# Patient Record
Sex: FEMALE | Race: WHITE | NOT HISPANIC OR LATINO | Employment: UNEMPLOYED | URBAN - METROPOLITAN AREA
[De-identification: names, ages, dates, MRNs, and addresses within clinical notes are randomized per-mention and may not be internally consistent; named-entity substitution may affect disease eponyms.]

---

## 2018-06-18 ENCOUNTER — HOSPITAL ENCOUNTER (EMERGENCY)
Facility: HOSPITAL | Age: 38
Discharge: HOME | End: 2018-06-18
Attending: EMERGENCY MEDICINE

## 2018-06-18 VITALS
DIASTOLIC BLOOD PRESSURE: 88 MMHG | HEART RATE: 78 BPM | TEMPERATURE: 99 F | HEIGHT: 66 IN | BODY MASS INDEX: 45 KG/M2 | OXYGEN SATURATION: 99 % | SYSTOLIC BLOOD PRESSURE: 148 MMHG | RESPIRATION RATE: 18 BRPM | WEIGHT: 280 LBS

## 2018-06-18 DIAGNOSIS — N93.9 VAGINAL BLEEDING: Primary | ICD-10-CM

## 2018-06-18 LAB
B-HCG UR QL: NEGATIVE
BACTERIA GENITAL QL WET PREP: ABNORMAL
CLUE CELLS VAG QL WET PREP: ABNORMAL
CTP QC/QA: YES
FILAMENT FUNGI VAG WET PREP-#/AREA: ABNORMAL
SPECIMEN SOURCE: ABNORMAL
T VAGINALIS GENITAL QL WET PREP: ABNORMAL
WBC #/AREA VAG WET PREP: ABNORMAL
YEAST GENITAL QL WET PREP: ABNORMAL

## 2018-06-18 PROCEDURE — 99283 EMERGENCY DEPT VISIT LOW MDM: CPT

## 2018-06-18 PROCEDURE — 81025 URINE PREGNANCY TEST: CPT | Performed by: PHYSICIAN ASSISTANT

## 2018-06-18 PROCEDURE — 87491 CHLMYD TRACH DNA AMP PROBE: CPT

## 2018-06-18 PROCEDURE — 87210 SMEAR WET MOUNT SALINE/INK: CPT

## 2018-06-18 NOTE — DISCHARGE INSTRUCTIONS
Continue tylenol and motrin as needed for pain.  Call and schedule follow up with a GYN.  For worsening symptoms, chest pain, shortness of breath, increased abdominal pain, high grade fever, stroke or stroke like symptoms, immediately go to the nearest Emergency Room or call 911 as soon as possible.

## 2018-06-18 NOTE — ED PROVIDER NOTES
"Encounter Date: 6/18/2018    SCRIBE #1 NOTE: I Rajanijacqueline Maciel, am scribing for, and in the presence of, Albertina Veliz PA-C.       History     Chief Complaint   Patient presents with    Vaginal Bleeding     since 6/14/18 with bilat groin pain.       06/18/2018 4:01 PM     Chief complaint: Vaginal Bleeding      Elvia Cheney is a 38 y.o. female with no pertinent PMHx or SHx who presents to the ED with complaints vaginal bleeding and lower abdominal pain. The patient states her last menstrual cycle was on the "1st of last month, which is normally when she has her cycle". The patient states having lower abdominal "cramping" pain that started x4 days ago (6/14) along with "heavy" vaginal bleeding. The patient states having to change her "ultra pad" once every hour. The patient is not on birth control currently. She denies burning during urination. She has taken Ibuprofen with no relief to pian. She denies having any other vaginal discharge. Dr. John Bruce is the patient's OB/GYN. The patient does report having a history of ovarian cysts. She has no other medical concerns or complaints at this moment. She denies onset of any other new symptoms currently. Penicillin allergy noted.       The history is provided by the patient.     Review of patient's allergies indicates:   Allergen Reactions    Penicillins Hives     History reviewed. No pertinent past medical history.  History reviewed. No pertinent surgical history.  History reviewed. No pertinent family history.  Social History   Substance Use Topics    Smoking status: Never Smoker    Smokeless tobacco: Not on file    Alcohol use Not on file     Review of Systems   Constitutional: Negative for activity change, appetite change, chills and fever.   HENT: Negative for congestion, rhinorrhea and sore throat.    Respiratory: Negative for cough, chest tightness and shortness of breath.    Cardiovascular: Negative for chest pain.   Gastrointestinal: Positive for " abdominal pain. Negative for abdominal distention, blood in stool, diarrhea, nausea and vomiting.   Genitourinary: Positive for urgency and vaginal bleeding. Negative for decreased urine volume, dysuria, frequency and vaginal discharge.   Musculoskeletal: Negative for back pain, neck pain and neck stiffness.   Skin: Negative for rash.   Neurological: Negative for dizziness, syncope, numbness and headaches.   Hematological: Does not bruise/bleed easily.       Physical Exam     Initial Vitals [06/18/18 1529]   BP Pulse Resp Temp SpO2   (!) 183/106 88 16 98.4 °F (36.9 °C) 97 %      MAP       --         Physical Exam    Nursing note and vitals reviewed.  Constitutional: Vital signs are normal. She appears well-developed and well-nourished. She is not diaphoretic. She is cooperative.  Non-toxic appearance. She does not have a sickly appearance. No distress.   HENT:   Head: Normocephalic and atraumatic.   Right Ear: Abnromal external ear normal.   Left Ear: Abnormal external ear normal.   Nose: Nose abnormal.   Mouth/Throat: Oropharynx is clear and moist.   Eyes: Conjunctivae and lids are normal. Pupils are equal, round, and reactive to light.   Neck: Normal range of motion and full passive range of motion without pain. Neck supple.   Cardiovascular: Normal rate, regular rhythm and normal heart sounds. Exam reveals no gallop and no friction rub.    No murmur heard.  Pulmonary/Chest: Breath sounds normal. She has no wheezes. She has no rhonchi. She has no rales.   Abdominal: Soft. Normal appearance. There is tenderness. There is no rigidity, no rebound and no guarding.   Mild lower abdominal tenderness.    Genitourinary: Cervix exhibits no motion tenderness. Right adnexum displays no tenderness. Left adnexum displays no tenderness.   Genitourinary Comments: Moderate amount of blood in vaginal vault. No signs of vaginal hemorrhage. No CMT. No adnexal tenderness.    Neurological: She is alert and oriented to person, place,  and time.   Skin: Skin is warm, dry and intact. No rash noted.         ED Course   Procedures  Labs Reviewed   VAGINAL SCREEN - Abnormal; Notable for the following:        Result Value    WBC - Vaginal Screen Rare (*)     Bacteria - Vaginal Screen Few (*)     All other components within normal limits   C. TRACHOMATIS/N. GONORRHOEAE BY AMP DNA   POCT URINE PREGNANCY          No orders to display        Medical Decision Making:   History:   Old Medical Records: I decided to obtain old medical records.  Clinical Tests:   Lab Tests: Reviewed and Ordered       APC / Resident Notes:   Urgent evaluation of a 38-year-old female who presents with vaginal bleeding.  She is UPT negative. She states that she is 2 weeks late for her routine menstrual cycle.  She reports heavier bleeding than normal. Her vital signs are stable. She has mild generalized tenderness to palpation.  exam shows a moderate amount of blood in the vaginal vault.  No active hemorrhage.  There is no cervical motion tenderness or adnexal tenderness. She denies concern for STDs.  I doubt PID or tea away.  She denies urinary symptoms.  UPT is negative so no concern for ectopic pregnancy or miscarriage.  Vaginal screen reviewed.  She needs to follow up with gyn for further evaluation. Discussed results with patient. Return precautions given. Based on my clinical evaluation, I do not appreciate any immediate, emergent, or life threatening condition or etiology that warrants additional workup today and feel that the patient can be discharged with close follow up care.  Patient is to follow up with their primary care provider. Case was discussed with Dr. Jo who is in agreement with the plan of care. All questions answered.          Scribe Attestation:   Scribe #1: I performed the above scribed service and the documentation accurately describes the services I performed. I attest to the accuracy of the note.      I, Albertina Veliz PA-C, personally  performed the services described in this documentation. All medical record entries made by the scribe were at my direction and in my presence.  I have reviewed the chart and agree that the record reflects my personal performance and is accurate and complete. Albertina Veliz PA-C.  5:31 PM 06/18/2018             Clinical Impression:   The encounter diagnosis was Vaginal bleeding.      Disposition:   Disposition: Discharged  Condition: Stable                        Albertina Veliz PA-C  06/18/18 8617

## 2018-06-19 LAB
C TRACH DNA SPEC QL NAA+PROBE: NOT DETECTED
N GONORRHOEA DNA SPEC QL NAA+PROBE: NOT DETECTED

## 2022-08-01 ENCOUNTER — OFFICE VISIT (OUTPATIENT)
Dept: URGENT CARE | Facility: CLINIC | Age: 42
End: 2022-08-01
Payer: COMMERCIAL

## 2022-08-01 VITALS
WEIGHT: 260 LBS | HEIGHT: 66 IN | OXYGEN SATURATION: 97 % | TEMPERATURE: 98 F | DIASTOLIC BLOOD PRESSURE: 85 MMHG | SYSTOLIC BLOOD PRESSURE: 131 MMHG | BODY MASS INDEX: 41.78 KG/M2 | HEART RATE: 87 BPM | RESPIRATION RATE: 16 BRPM

## 2022-08-01 DIAGNOSIS — M54.2 NECK PAIN ON LEFT SIDE: Primary | ICD-10-CM

## 2022-08-01 DIAGNOSIS — M43.6 NECK STIFFNESS: ICD-10-CM

## 2022-08-01 DIAGNOSIS — M54.12 CERVICAL RADICULOPATHY: ICD-10-CM

## 2022-08-01 PROCEDURE — 99213 OFFICE O/P EST LOW 20 MIN: CPT | Mod: S$GLB,,,

## 2022-08-01 PROCEDURE — 99213 PR OFFICE/OUTPT VISIT, EST, LEVL III, 20-29 MIN: ICD-10-PCS | Mod: S$GLB,,,

## 2022-08-01 RX ORDER — PHENTERMINE HYDROCHLORIDE 37.5 MG/1
37.5 TABLET ORAL EVERY MORNING
COMMUNITY
Start: 2022-07-29 | End: 2023-09-25

## 2022-08-01 RX ORDER — CYCLOBENZAPRINE HCL 5 MG
5 TABLET ORAL 3 TIMES DAILY PRN
Qty: 15 TABLET | Refills: 0 | Status: SHIPPED | OUTPATIENT
Start: 2022-08-01 | End: 2022-08-06

## 2022-08-01 RX ORDER — PREDNISONE 20 MG/1
20 TABLET ORAL 2 TIMES DAILY
Qty: 10 TABLET | Refills: 0 | Status: SHIPPED | OUTPATIENT
Start: 2022-08-01 | End: 2022-08-06

## 2022-08-01 NOTE — PROGRESS NOTES
"Subjective:       Patient ID: Elvia Cheney is a 42 y.o. female.    Vitals:  height is 5' 6" (1.676 m) and weight is 117.9 kg (260 lb). Her temperature is 97.9 °F (36.6 °C). Her blood pressure is 131/85 and her pulse is 87. Her respiration is 16 and oxygen saturation is 97%.     Chief Complaint: Neck Pain (/)    Left side of her neck started hurting a couple weeks ago and now its radiating down her left arm. meds tried- tylenol and ibuprofen. Denies injury. She goes to the gym some but that's it.      Constitution: Negative for activity change, appetite change, chills, sweating and fever.   HENT: Negative for ear pain, sinus pain, sinus pressure and sore throat.    Neck: Negative for neck pain.   Cardiovascular: Negative for chest pain.   Eyes: Negative for blurred vision.   Respiratory: Negative for chest tightness, cough and shortness of breath.    Gastrointestinal: Negative for abdominal pain.   Musculoskeletal: Positive for pain (left sided neck pain that radiates down her arm).   Neurological: Negative for dizziness and history of vertigo.     Denies loss of bowel or bladder control. No groin or perineal numbness.   Objective:      Physical Exam   Constitutional: She is oriented to person, place, and time.  Non-toxic appearance. She does not appear ill. No distress.   HENT:   Head: Normocephalic.   Nose: Nose normal.   Eyes: Conjunctivae are normal. Extraocular movement intact   Neck: No neck rigidity present. No decreased range of motion present. pain with movement (left side) present.   Cardiovascular: Normal rate, normal heart sounds and normal pulses.   Pulmonary/Chest: Effort normal and breath sounds normal. No respiratory distress.   Musculoskeletal:      Cervical back: She exhibits tenderness (left paraspinal pain that radiates down left arm). She exhibits no bony tenderness.      Thoracic back: Normal. She exhibits normal range of motion and no bony tenderness.      Lumbar back: Normal. She exhibits no " tenderness and no bony tenderness.   Neurological: no focal deficit. She is alert and oriented to person, place, and time.   Skin: Skin is not diaphoretic. Capillary refill takes 2 to 3 seconds.   Psychiatric: Her behavior is normal. Mood normal.   5/5 strength on bilateral arms and legs. Normal sensation.       Assessment:       1. Neck pain on left side    2. Cervical radiculopathy    3. Neck stiffness          Plan:         Neck pain on left side    Cervical radiculopathy  -     predniSONE (DELTASONE) 20 MG tablet; Take 1 tablet (20 mg total) by mouth 2 (two) times daily. for 5 days  Dispense: 10 tablet; Refill: 0    Neck stiffness  -     cyclobenzaprine (FLEXERIL) 5 MG tablet; Take 1 tablet (5 mg total) by mouth 3 (three) times daily as needed for Muscle spasms.  Dispense: 15 tablet; Refill: 0         Patient presents with left sided neck pain x 2 weeks. Denies injury, trauma or fall. Patient has 5/5 strength in bilateral arms. Patient has no bony tenderness on exam. Patient offered xray but declined. Likely cervical radiculopathy. Will treat with steroids and muscle ralexers. Patient informed to follow up with ortho if symptoms do not improve or worsen.

## 2022-08-01 NOTE — PATIENT INSTRUCTIONS
Rotate tylenol and motrin as needed for pain.     Do not drive or drink alcohol while taking muscle relaxer's.     Rest, Ice, elevate and compression of the affected extremity.     If no improvements with conservative measurements follow up with Orthopedics as directed.      [facility] [place]: [phone]

## 2022-11-02 ENCOUNTER — OFFICE VISIT (OUTPATIENT)
Dept: URGENT CARE | Facility: CLINIC | Age: 42
End: 2022-11-02
Payer: COMMERCIAL

## 2022-11-02 VITALS
WEIGHT: 258.38 LBS | BODY MASS INDEX: 41.52 KG/M2 | RESPIRATION RATE: 20 BRPM | TEMPERATURE: 98 F | OXYGEN SATURATION: 97 % | HEART RATE: 101 BPM | DIASTOLIC BLOOD PRESSURE: 84 MMHG | HEIGHT: 66 IN | SYSTOLIC BLOOD PRESSURE: 129 MMHG

## 2022-11-02 DIAGNOSIS — J06.9 VIRAL URI WITH COUGH: ICD-10-CM

## 2022-11-02 DIAGNOSIS — R05.9 COUGH, UNSPECIFIED TYPE: Primary | ICD-10-CM

## 2022-11-02 LAB
CTP QC/QA: YES
CTP QC/QA: YES
FLUAV AG NPH QL: NEGATIVE
FLUBV AG NPH QL: NEGATIVE
SARS-COV-2 AG RESP QL IA.RAPID: NEGATIVE

## 2022-11-02 PROCEDURE — 87804 INFLUENZA ASSAY W/OPTIC: CPT | Mod: QW,,, | Performed by: NURSE PRACTITIONER

## 2022-11-02 PROCEDURE — 87811 SARS CORONAVIRUS 2 ANTIGEN POCT, MANUAL READ: ICD-10-PCS | Mod: QW,S$GLB,, | Performed by: NURSE PRACTITIONER

## 2022-11-02 PROCEDURE — 87804 POCT INFLUENZA A/B: ICD-10-PCS | Mod: 59,QW,, | Performed by: NURSE PRACTITIONER

## 2022-11-02 PROCEDURE — 99204 PR OFFICE/OUTPT VISIT, NEW, LEVL IV, 45-59 MIN: ICD-10-PCS | Mod: S$GLB,,, | Performed by: NURSE PRACTITIONER

## 2022-11-02 PROCEDURE — 87811 SARS-COV-2 COVID19 W/OPTIC: CPT | Mod: QW,S$GLB,, | Performed by: NURSE PRACTITIONER

## 2022-11-02 PROCEDURE — 99204 OFFICE O/P NEW MOD 45 MIN: CPT | Mod: S$GLB,,, | Performed by: NURSE PRACTITIONER

## 2022-11-02 RX ORDER — BENZONATATE 200 MG/1
200 CAPSULE ORAL 3 TIMES DAILY PRN
Qty: 21 CAPSULE | Refills: 0 | Status: SHIPPED | OUTPATIENT
Start: 2022-11-02 | End: 2022-11-09

## 2022-11-02 RX ORDER — PROMETHAZINE HYDROCHLORIDE AND DEXTROMETHORPHAN HYDROBROMIDE 6.25; 15 MG/5ML; MG/5ML
5 SYRUP ORAL NIGHTLY PRN
Qty: 50 ML | Refills: 0 | Status: SHIPPED | OUTPATIENT
Start: 2022-11-02 | End: 2022-11-12

## 2022-11-02 RX ORDER — PROMETHAZINE HYDROCHLORIDE AND DEXTROMETHORPHAN HYDROBROMIDE 6.25; 15 MG/5ML; MG/5ML
5 SYRUP ORAL NIGHTLY PRN
Qty: 50 ML | Refills: 0 | Status: SHIPPED | OUTPATIENT
Start: 2022-11-02 | End: 2022-11-02

## 2022-11-02 RX ORDER — BENZONATATE 200 MG/1
200 CAPSULE ORAL 3 TIMES DAILY PRN
Qty: 21 CAPSULE | Refills: 0 | Status: SHIPPED | OUTPATIENT
Start: 2022-11-02 | End: 2022-11-02

## 2022-11-02 RX ORDER — FLUTICASONE PROPIONATE 50 MCG
1 SPRAY, SUSPENSION (ML) NASAL DAILY
Qty: 18.2 ML | Refills: 0 | Status: SHIPPED | OUTPATIENT
Start: 2022-11-02 | End: 2022-11-02

## 2022-11-02 RX ORDER — FLUTICASONE PROPIONATE 50 MCG
1 SPRAY, SUSPENSION (ML) NASAL DAILY
Qty: 18.2 ML | Refills: 0 | Status: SHIPPED | OUTPATIENT
Start: 2022-11-02 | End: 2023-06-07

## 2022-11-02 NOTE — PROGRESS NOTES
"Subjective:       Patient ID: Elvia Cheney is a 42 y.o. female.    Vitals:  height is 5' 6" (1.676 m) and weight is 117.2 kg (258 lb 6.4 oz). Her temperature is 97.5 °F (36.4 °C). Her blood pressure is 129/84 and her pulse is 101. Her respiration is 20 and oxygen saturation is 97%.     Chief Complaint: Cough    Cough  The current episode started in the past 7 days (3 days). The problem has been waxing and waning. Associated symptoms include chills, nasal congestion, rhinorrhea and a sore throat.     Constitution: Positive for chills.   HENT:  Positive for sore throat.    Respiratory:  Positive for cough.      Objective:      Physical Exam   Constitutional: She is oriented to person, place, and time.   HENT:   Head: Normocephalic and atraumatic.   Ears:   Right Ear: Tympanic membrane, external ear and ear canal normal.   Left Ear: Tympanic membrane, external ear and ear canal normal.   Nose: Congestion present.   Mouth/Throat: Posterior oropharyngeal erythema present.   Eyes: Conjunctivae are normal. Extraocular movement intact   Neck: Neck supple.   Cardiovascular: Normal rate, regular rhythm, normal heart sounds and normal pulses.   Pulmonary/Chest: Effort normal and breath sounds normal.   Abdominal: Normal appearance. Soft.   Musculoskeletal: Normal range of motion.         General: Normal range of motion.   Neurological: She is alert and oriented to person, place, and time.   Skin: Skin is warm and dry. Capillary refill takes 2 to 3 seconds.   Psychiatric: Her behavior is normal. Mood normal.   Nursing note and vitals reviewed.      Assessment:       1. Cough, unspecified type    2. Viral URI with cough      Influenza A/B: Negative    Covid antigen: Negative    Plan:         Cough, unspecified type  -     POCT Influenza A/B  -     SARS Coronavirus 2 Antigen, POCT Manual Read    Viral URI with cough  -     promethazine-dextromethorphan (PROMETHAZINE-DM) 6.25-15 mg/5 mL Syrp; Take 5 mLs by mouth nightly as needed " (cough).  Dispense: 50 mL; Refill: 0  -     benzonatate (TESSALON) 200 MG capsule; Take 1 capsule (200 mg total) by mouth 3 (three) times daily as needed for Cough.  Dispense: 21 capsule; Refill: 0  -     fluticasone propionate (FLONASE) 50 mcg/actuation nasal spray; 1 spray (50 mcg total) by Each Nostril route once daily.  Dispense: 18.2 mL; Refill: 0       Rest  Increase fluid intake to thin secretions  Promethazine DM 1 teaspoon at bedtime as needed for cough  Tessalon 200mg capsule Take 1 capsule 3 times daily as needed for cough  Flonase nasal spray 1 spray to each nostril daily  Tylenol/Ibuprofen as directed for fever/body aches   Follow up if your symptoms persist or worsen

## 2022-11-02 NOTE — PATIENT INSTRUCTIONS
Rest  Increase fluid intake to thin secretions  Promethazine DM 1 teaspoon at bedtime as needed for cough  Tessalon 200mg capsule Take 1 capsule 3 times daily as needed for cough  Flonase nasal spray 1 spray to each nostril daily  Tylenol/Ibuprofen as directed for fever/body aches

## 2023-06-07 ENCOUNTER — OFFICE VISIT (OUTPATIENT)
Dept: FAMILY MEDICINE | Facility: CLINIC | Age: 43
End: 2023-06-07
Payer: COMMERCIAL

## 2023-06-07 VITALS
WEIGHT: 265.63 LBS | OXYGEN SATURATION: 96 % | BODY MASS INDEX: 39.34 KG/M2 | HEIGHT: 69 IN | TEMPERATURE: 99 F | SYSTOLIC BLOOD PRESSURE: 132 MMHG | HEART RATE: 94 BPM | DIASTOLIC BLOOD PRESSURE: 88 MMHG

## 2023-06-07 DIAGNOSIS — Z12.4 ENCOUNTER FOR SCREENING FOR CERVICAL CANCER: ICD-10-CM

## 2023-06-07 DIAGNOSIS — Z12.31 SCREENING MAMMOGRAM FOR BREAST CANCER: ICD-10-CM

## 2023-06-07 DIAGNOSIS — T30.0 BURN: ICD-10-CM

## 2023-06-07 DIAGNOSIS — Z00.00 ENCOUNTER FOR ANNUAL GENERAL MEDICAL EXAMINATION WITHOUT ABNORMAL FINDINGS IN ADULT: Primary | ICD-10-CM

## 2023-06-07 DIAGNOSIS — E66.9 OBESITY (BMI 35.0-39.9 WITHOUT COMORBIDITY): ICD-10-CM

## 2023-06-07 PROCEDURE — 99213 PR OFFICE/OUTPT VISIT, EST, LEVL III, 20-29 MIN: ICD-10-PCS | Mod: S$GLB,,, | Performed by: STUDENT IN AN ORGANIZED HEALTH CARE EDUCATION/TRAINING PROGRAM

## 2023-06-07 PROCEDURE — 99999 PR PBB SHADOW E&M-EST. PATIENT-LVL V: ICD-10-PCS | Mod: PBBFAC,,, | Performed by: STUDENT IN AN ORGANIZED HEALTH CARE EDUCATION/TRAINING PROGRAM

## 2023-06-07 PROCEDURE — 99999 PR PBB SHADOW E&M-EST. PATIENT-LVL V: CPT | Mod: PBBFAC,,, | Performed by: STUDENT IN AN ORGANIZED HEALTH CARE EDUCATION/TRAINING PROGRAM

## 2023-06-07 PROCEDURE — 99213 OFFICE O/P EST LOW 20 MIN: CPT | Mod: S$GLB,,, | Performed by: STUDENT IN AN ORGANIZED HEALTH CARE EDUCATION/TRAINING PROGRAM

## 2023-06-07 RX ORDER — SILVER SULFADIAZINE 10 G/1000G
CREAM TOPICAL 2 TIMES DAILY
Qty: 50 G | Refills: 0 | Status: SHIPPED | OUTPATIENT
Start: 2023-06-07 | End: 2023-09-05

## 2023-06-07 NOTE — PATIENT INSTRUCTIONS
Rajesh Gan,     If you are due for any health screening(s) below please notify me so we can arrange them to be ordered and scheduled to maintain your health. Most healthy patients complete it. Don't lose out on improving your health.     Tests to Keep You Healthy    Mammogram: DUE  Cervical Cancer Screening: DUE      Breast Cancer Screening    Breast cancer is the second most common cancer in women after skin cancer, and the second leading cause of death from cancer after lung cancer. Mammograms can detect breast cancer early, which significantly increases the chances of curing the cancer.      A screening mammogram is an x-ray image of the breasts used for early breast cancer detection. It can help reduce the number of deaths from breast cancer among women. To get a clear image, the breast is placed between two plastic plates to make it flat. How often a mammogram is needed depends on your age and your breast cancer risk.         Dear Ms. Cheney:    Your [facility] is dedicated to helping you stay healthy with regularly scheduled recommended screenings.  Scheduling routine screenings is important to maintaining good health. Our records indicate that you may be overdue for your screening pap smear. A pap smear screening can help identify patients at risk for developing cervical cancer at an early stage, when it is most likely to be successfully treated.    We encourage you to schedule your appointment with your womens health provider or some primary care providers also perform this screening.    If you have completed or scheduled your pap smear screening outside of [facility], please notify your primary care team so we can update your health record.      If you have questions or would like to schedule your screening, please contact your primary care clinic.    Sincerely,    Your [facility]

## 2023-06-07 NOTE — ASSESSMENT & PLAN NOTE
Body mass index is 39.23 kg/m².  Patient was previously on Adipex plans to  follow-up with weight loss clinic.  Recommended low-fat low-cholesterol diet, recommend exercise at least 3 times per week for 30-40 minutes.
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
## 2023-06-08 ENCOUNTER — HOSPITAL ENCOUNTER (OUTPATIENT)
Dept: RADIOLOGY | Facility: CLINIC | Age: 43
Discharge: HOME | End: 2023-06-08
Attending: STUDENT IN AN ORGANIZED HEALTH CARE EDUCATION/TRAINING PROGRAM
Payer: COMMERCIAL

## 2023-06-08 DIAGNOSIS — Z12.31 SCREENING MAMMOGRAM FOR BREAST CANCER: ICD-10-CM

## 2023-06-08 PROCEDURE — 77067 SCR MAMMO BI INCL CAD: CPT | Mod: TC,PO

## 2023-06-08 PROCEDURE — 77063 MAMMO DIGITAL SCREENING BILAT WITH TOMO: ICD-10-PCS | Mod: 26,,, | Performed by: RADIOLOGY

## 2023-06-08 PROCEDURE — 77067 SCR MAMMO BI INCL CAD: CPT | Mod: 26,,, | Performed by: RADIOLOGY

## 2023-06-08 PROCEDURE — 77063 BREAST TOMOSYNTHESIS BI: CPT | Mod: 26,,, | Performed by: RADIOLOGY

## 2023-06-08 PROCEDURE — 77067 MAMMO DIGITAL SCREENING BILAT WITH TOMO: ICD-10-PCS | Mod: 26,,, | Performed by: RADIOLOGY

## 2023-06-09 ENCOUNTER — TELEPHONE (OUTPATIENT)
Dept: FAMILY MEDICINE | Facility: CLINIC | Age: 43
End: 2023-06-09
Payer: COMMERCIAL

## 2023-06-09 ENCOUNTER — OFFICE VISIT (OUTPATIENT)
Dept: OBSTETRICS AND GYNECOLOGY | Facility: CLINIC | Age: 43
End: 2023-06-09
Payer: COMMERCIAL

## 2023-06-09 ENCOUNTER — TELEPHONE (OUTPATIENT)
Dept: NEUROLOGY | Facility: CLINIC | Age: 43
End: 2023-06-09
Payer: COMMERCIAL

## 2023-06-09 ENCOUNTER — PATIENT MESSAGE (OUTPATIENT)
Dept: FAMILY MEDICINE | Facility: CLINIC | Age: 43
End: 2023-06-09
Payer: COMMERCIAL

## 2023-06-09 VITALS
BODY MASS INDEX: 39.18 KG/M2 | HEART RATE: 92 BPM | SYSTOLIC BLOOD PRESSURE: 148 MMHG | HEIGHT: 69 IN | WEIGHT: 264.56 LBS | DIASTOLIC BLOOD PRESSURE: 76 MMHG

## 2023-06-09 DIAGNOSIS — Z12.4 ENCOUNTER FOR SCREENING FOR CERVICAL CANCER: ICD-10-CM

## 2023-06-09 DIAGNOSIS — G89.29 CHRONIC NONINTRACTABLE HEADACHE, UNSPECIFIED HEADACHE TYPE: ICD-10-CM

## 2023-06-09 DIAGNOSIS — Z30.011 ENCOUNTER FOR INITIAL PRESCRIPTION OF CONTRACEPTIVE PILLS: ICD-10-CM

## 2023-06-09 DIAGNOSIS — Z71.85 HPV VACCINE COUNSELING: ICD-10-CM

## 2023-06-09 DIAGNOSIS — E11.9 TYPE 2 DIABETES MELLITUS WITHOUT COMPLICATION, WITHOUT LONG-TERM CURRENT USE OF INSULIN: Primary | ICD-10-CM

## 2023-06-09 DIAGNOSIS — E11.69 HYPERLIPIDEMIA ASSOCIATED WITH TYPE 2 DIABETES MELLITUS: ICD-10-CM

## 2023-06-09 DIAGNOSIS — E78.5 HYPERLIPIDEMIA ASSOCIATED WITH TYPE 2 DIABETES MELLITUS: ICD-10-CM

## 2023-06-09 DIAGNOSIS — R51.9 CHRONIC NONINTRACTABLE HEADACHE, UNSPECIFIED HEADACHE TYPE: ICD-10-CM

## 2023-06-09 DIAGNOSIS — Z01.419 WELL WOMAN EXAM WITH ROUTINE GYNECOLOGICAL EXAM: Primary | ICD-10-CM

## 2023-06-09 PROBLEM — Z12.31 SCREENING MAMMOGRAM FOR BREAST CANCER: Status: RESOLVED | Noted: 2023-06-07 | Resolved: 2023-06-09

## 2023-06-09 PROBLEM — Z00.00 ENCOUNTER FOR ANNUAL GENERAL MEDICAL EXAMINATION WITHOUT ABNORMAL FINDINGS IN ADULT: Status: RESOLVED | Noted: 2023-06-07 | Resolved: 2023-06-09

## 2023-06-09 PROCEDURE — 3078F DIAST BP <80 MM HG: CPT | Mod: CPTII,S$GLB,, | Performed by: STUDENT IN AN ORGANIZED HEALTH CARE EDUCATION/TRAINING PROGRAM

## 2023-06-09 PROCEDURE — 99386 PR PREVENTIVE VISIT,NEW,40-64: ICD-10-PCS | Mod: S$GLB,,, | Performed by: STUDENT IN AN ORGANIZED HEALTH CARE EDUCATION/TRAINING PROGRAM

## 2023-06-09 PROCEDURE — 3008F BODY MASS INDEX DOCD: CPT | Mod: CPTII,S$GLB,, | Performed by: STUDENT IN AN ORGANIZED HEALTH CARE EDUCATION/TRAINING PROGRAM

## 2023-06-09 PROCEDURE — 1159F PR MEDICATION LIST DOCUMENTED IN MEDICAL RECORD: ICD-10-PCS | Mod: CPTII,S$GLB,, | Performed by: STUDENT IN AN ORGANIZED HEALTH CARE EDUCATION/TRAINING PROGRAM

## 2023-06-09 PROCEDURE — 3077F SYST BP >= 140 MM HG: CPT | Mod: CPTII,S$GLB,, | Performed by: STUDENT IN AN ORGANIZED HEALTH CARE EDUCATION/TRAINING PROGRAM

## 2023-06-09 PROCEDURE — 3051F PR MOST RECENT HEMOGLOBIN A1C LEVEL 7.0 - < 8.0%: ICD-10-PCS | Mod: CPTII,S$GLB,, | Performed by: STUDENT IN AN ORGANIZED HEALTH CARE EDUCATION/TRAINING PROGRAM

## 2023-06-09 PROCEDURE — 1160F RVW MEDS BY RX/DR IN RCRD: CPT | Mod: CPTII,S$GLB,, | Performed by: STUDENT IN AN ORGANIZED HEALTH CARE EDUCATION/TRAINING PROGRAM

## 2023-06-09 PROCEDURE — 99999 PR PBB SHADOW E&M-EST. PATIENT-LVL IV: ICD-10-PCS | Mod: PBBFAC,,, | Performed by: STUDENT IN AN ORGANIZED HEALTH CARE EDUCATION/TRAINING PROGRAM

## 2023-06-09 PROCEDURE — 99386 PREV VISIT NEW AGE 40-64: CPT | Mod: S$GLB,,, | Performed by: STUDENT IN AN ORGANIZED HEALTH CARE EDUCATION/TRAINING PROGRAM

## 2023-06-09 PROCEDURE — 99999 PR PBB SHADOW E&M-EST. PATIENT-LVL IV: CPT | Mod: PBBFAC,,, | Performed by: STUDENT IN AN ORGANIZED HEALTH CARE EDUCATION/TRAINING PROGRAM

## 2023-06-09 PROCEDURE — 3051F HG A1C>EQUAL 7.0%<8.0%: CPT | Mod: CPTII,S$GLB,, | Performed by: STUDENT IN AN ORGANIZED HEALTH CARE EDUCATION/TRAINING PROGRAM

## 2023-06-09 PROCEDURE — 3078F PR MOST RECENT DIASTOLIC BLOOD PRESSURE < 80 MM HG: ICD-10-PCS | Mod: CPTII,S$GLB,, | Performed by: STUDENT IN AN ORGANIZED HEALTH CARE EDUCATION/TRAINING PROGRAM

## 2023-06-09 PROCEDURE — 3008F PR BODY MASS INDEX (BMI) DOCUMENTED: ICD-10-PCS | Mod: CPTII,S$GLB,, | Performed by: STUDENT IN AN ORGANIZED HEALTH CARE EDUCATION/TRAINING PROGRAM

## 2023-06-09 PROCEDURE — 1159F MED LIST DOCD IN RCRD: CPT | Mod: CPTII,S$GLB,, | Performed by: STUDENT IN AN ORGANIZED HEALTH CARE EDUCATION/TRAINING PROGRAM

## 2023-06-09 PROCEDURE — 1160F PR REVIEW ALL MEDS BY PRESCRIBER/CLIN PHARMACIST DOCUMENTED: ICD-10-PCS | Mod: CPTII,S$GLB,, | Performed by: STUDENT IN AN ORGANIZED HEALTH CARE EDUCATION/TRAINING PROGRAM

## 2023-06-09 PROCEDURE — 3077F PR MOST RECENT SYSTOLIC BLOOD PRESSURE >= 140 MM HG: ICD-10-PCS | Mod: CPTII,S$GLB,, | Performed by: STUDENT IN AN ORGANIZED HEALTH CARE EDUCATION/TRAINING PROGRAM

## 2023-06-09 PROCEDURE — 87624 HPV HI-RISK TYP POOLED RSLT: CPT | Performed by: STUDENT IN AN ORGANIZED HEALTH CARE EDUCATION/TRAINING PROGRAM

## 2023-06-09 PROCEDURE — 88175 CYTOPATH C/V AUTO FLUID REDO: CPT | Performed by: STUDENT IN AN ORGANIZED HEALTH CARE EDUCATION/TRAINING PROGRAM

## 2023-06-09 RX ORDER — METFORMIN HYDROCHLORIDE 500 MG/1
500 TABLET, EXTENDED RELEASE ORAL 2 TIMES DAILY WITH MEALS
Qty: 120 TABLET | Refills: 2 | Status: SHIPPED | OUTPATIENT
Start: 2023-06-09 | End: 2023-06-14 | Stop reason: ALTCHOICE

## 2023-06-09 RX ORDER — NORETHINDRONE ACETATE AND ETHINYL ESTRADIOL 1MG-20(21)
1 KIT ORAL DAILY
Qty: 90 TABLET | Refills: 0 | Status: SHIPPED | OUTPATIENT
Start: 2023-06-09 | End: 2023-09-01

## 2023-06-09 RX ORDER — ATORVASTATIN CALCIUM 20 MG/1
20 TABLET, FILM COATED ORAL DAILY
Qty: 90 TABLET | Refills: 3 | Status: SHIPPED | OUTPATIENT
Start: 2023-06-09 | End: 2024-06-08

## 2023-06-09 NOTE — PROGRESS NOTES
[facility]    Subjective:     Chief Complaint:   Chief Complaint   Patient presents with    Annual Exam       Patient's last menstrual period was Patient's last menstrual period was 2023 (exact date).  Contraception: None.  HRT: None.    2023    Elvia Cheney 42 y.o. female  who presents for an annual exam.  She participates in regular exercise: gym with .  She does not smoke.  She wears seatbelts.  She is not taking a multivitamin, vitamin D, and calcium.  She denies any domestic violence.    She is interested in starting something for birth control she does not want any children.  Denies any issues with her cycle.  She does report chronic issues with headaches.  She has never been to a neurologist.  She is interested in seeing one.  Otherwise, denies any history of blood clots or strokes.    Last Pap: years ago. Denies any history of abnormal pap smears.  Last mammogram: 23. Results: normal--routine follow-up in 12 months.    FH:  Breast cancer: maternal grandmother.  Colon cancer: none.  Endometrial cancer: none.  Ovarian cancer: none.      OB History    Para Term  AB Living   0 0 0 0 0 0   SAB IAB Ectopic Multiple Live Births   0 0 0 0 0       History reviewed. No pertinent past medical history.  History reviewed. No pertinent surgical history.  Review of patient's allergies indicates:   Allergen Reactions    Penicillins Hives       Social History     Socioeconomic History    Marital status:    Tobacco Use    Smoking status: Never    Smokeless tobacco: Never   Substance and Sexual Activity    Alcohol use: Not Currently    Drug use: Never    Sexual activity: Yes     Partners: Male     Birth control/protection: None       Family History   Problem Relation Age of Onset    Diabetes Maternal Grandmother     Hypertension Maternal Grandmother     Diabetes Paternal Grandfather     Hypertension Paternal Grandfather     Hyperthyroidism Maternal Aunt   "      Medications  Current Outpatient Medications on File Prior to Visit   Medication Sig Dispense Refill Last Dose    silver sulfADIAZINE 1% (SILVADENE) 1 % cream Apply topically 2 (two) times daily. Apply to abdominal burn twice per day. 50 g 0 Taking    phentermine (ADIPEX-P) 37.5 mg tablet Take 37.5 mg by mouth every morning.          Review of Systems   Constitutional: Negative for appetite change, fever and unexpected weight change.   Respiratory: Negative for cough and shortness of breath.    Cardiovascular: Negative for chest pain and palpitations.   Gastrointestinal: Negative for abdominal distention, constipation, nausea and vomiting.   Genitourinary: Negative for dyspareunia, dysuria, hematuria and pelvic pain.        GYN ROS per HPI.   Musculoskeletal: Negative for gait problem and myalgias.   Skin: Negative for rash.   Neurological: Negative for dizziness, light-headedness and headaches.   Psychiatric/Behavioral: The patient is not nervous/anxious.      Objective:     BP (!) 148/76 (BP Location: Left arm, Patient Position: Sitting, BP Method: Medium (Automatic))   Pulse 92   Ht 5' 9" (1.753 m)   Wt 120 kg (264 lb 8.8 oz)   LMP 05/16/2023 (Exact Date)   BMI 39.07 kg/m²     Physical Exam  Vitals reviewed. Exam conducted with a chaperone present.   Constitutional:       General: She is not in acute distress.  HENT:      Head: Normocephalic.   Eyes:      General: No scleral icterus.  Cardiovascular:      Rate and Rhythm: Normal rate and regular rhythm.   Pulmonary:      Effort: Pulmonary effort is normal. No respiratory distress.      Breath sounds: Normal breath sounds.   Chest:   Breasts:     Right: No bleeding, mass, nipple discharge, skin change or tenderness.      Left: No bleeding, mass, nipple discharge, skin change or tenderness.   Abdominal:      General: Abdomen is flat.      Palpations: Abdomen is soft.       Genitourinary:     General: Normal vulva.      Exam position: Supine.      Pubic " Area: No rash.       Labia:         Right: No tenderness or lesion.         Left: No tenderness or lesion.       Vagina: Normal. No tenderness.      Cervix: No cervical motion tenderness.      Uterus: Normal. Not tender.       Adnexa: Right adnexa normal and left adnexa normal.   Lymphadenopathy:      Upper Body:      Right upper body: No axillary or pectoral adenopathy.      Left upper body: No axillary or pectoral adenopathy.   Skin:     Findings: No rash.   Neurological:      General: No focal deficit present.      Mental Status: She is alert.   Psychiatric:         Mood and Affect: Mood normal.         Behavior: Behavior normal.       Assessment:     1. Well woman exam with routine gynecological exam    2. Encounter for screening for cervical cancer    3. HPV vaccine counseling    4. Chronic nonintractable headache, unspecified headache type    5. Encounter for initial prescription of contraceptive pills      Plan:     42 y.o. female presents today for annual pap smear and exam.     1. Well woman exam with routine gynecological exam    2. Encounter for screening for cervical cancer  - Ambulatory referral/consult to Gynecology  - Liquid-Based Pap Smear, Screening  - HPV High Risk Genotypes, PCR    3. HPV vaccine counseling    4. Chronic nonintractable headache, unspecified headache type  - Ambulatory referral/consult to Neurology; Future    5. Encounter for initial prescription of contraceptive pills  - norethindrone-ethinyl estradiol (JUNEL FE 1/20) 1 mg-20 mcg (21)/75 mg (7) per tablet; Take 1 tablet by mouth once daily.  Dispense: 90 tablet; Refill: 0      Follow up in about 3 months (around 9/9/2023).       The above was reviewed and discussed with the patient.    Annual exam and screening issues based on the patient's age and family history were discussed.     Upon further discussion, she reports burning herself when she was making beans.  She is using Silvadene was prescribed by primary.  Burn is healing  well.      Conservative versus medical management was reviewed and discussed.      The pros, cons, risks, benefits, alternatives, potential side effects, indications, failure rates and other issues associated with birth control were reviewed and discussed.  We discussed barrier methods, spermicides, hormonal birth control such as oral contraceptives, patches, vaginal rings, Depo-Provera and the subdermal implant.  In addition, IUDs were discussed.  The pros, cons, risks, benefits, alternatives and indications of the medication(s) prescribed, as well as appropriate use discussed.    She elects to start OCP.    We discussed a Sunday start.  The patient will follow-up in 4 weeks if she does not note the initiation of menses.  She will otherwise plan to follow up as needed or in 3 months for re-evaluation.     - Pap and HPV testing performed.   - Mammogram 6-8-23. Repeat in 1 year.  - Colonoscopy N/A.  - Tobacco cessation N/A.    - DEXA N/A.  - Counseled to take daily multivitamin. If patient is of reproductive age and not on contraception, to take prenatal vitamin. Patient has been counseled on the vitamin D and calcium requirements per ACOG recommendations.     Age         Calcium(mg/day)       Vitamin D (IU/day)  9-18                1300                       600  19-50              1000                       600  51-70              1200                       600  >70                  1200                       800    The patient was provided with GARDASIL 9 vaccine information.  The pros, cons, risks, benefits, alternatives and indications of the GARDASIL vaccine were discussed.  We discussed the 0, 2 months, and 6 months dosing.     She would like the GARDASIL / Human papilloma virus vaccine.  Plan to give Gardasil at next visit.    Start OCP.    Neurology referral for evaluation of headaches.       The patient's questions were answered, and she agrees with the current plan.      The patient was counseled today on  the new ACS guidelines for cervical cytology screening as well as the current recommendations for breast cancer screening. She was counseled to follow up with her PCP for other routine health maintenance.      Larisa Bray PA-C  06/09/2023

## 2023-06-09 NOTE — TELEPHONE ENCOUNTER
Spoke to the pt, appt scheduled on 7/19/23 at 0800 with Gwendolyn cano 7/19/23 at 0800 for h/a's. Date, time and location discussed.

## 2023-06-09 NOTE — TREATMENT PLAN
Called patient to discuss lab results.  Informed her of A1c as well as abnormal labs.  Encouraged low carb low-cholesterol diet  Sent metformin, Lipitor to pharmacy on file.  Patient verbalized understanding of care plan.

## 2023-06-14 ENCOUNTER — PATIENT MESSAGE (OUTPATIENT)
Dept: FAMILY MEDICINE | Facility: CLINIC | Age: 43
End: 2023-06-14
Payer: COMMERCIAL

## 2023-06-14 DIAGNOSIS — E11.9 TYPE 2 DIABETES MELLITUS WITHOUT COMPLICATION, WITHOUT LONG-TERM CURRENT USE OF INSULIN: Primary | ICD-10-CM

## 2023-06-14 RX ORDER — SEMAGLUTIDE 1.34 MG/ML
INJECTION, SOLUTION SUBCUTANEOUS
Qty: 20 ML | Refills: 2 | Status: SHIPPED | OUTPATIENT
Start: 2023-06-14 | End: 2023-08-13

## 2023-06-14 RX ORDER — ONDANSETRON HYDROCHLORIDE 8 MG/1
8 TABLET, FILM COATED ORAL EVERY 8 HOURS PRN
Qty: 10 TABLET | Refills: 2 | Status: SHIPPED | OUTPATIENT
Start: 2023-06-14 | End: 2023-09-25

## 2023-06-14 NOTE — TELEPHONE ENCOUNTER
Reviewed patient message regarding nausea and vomiting with use of metformin.  Please inform patient that these are often common side effects of metformin they usually subside within a few days.  However she is not comfortable with continuing metformin I will send Ozempic to the pharmacy and she can discontinue metformin.  I have also sent Zofran for use as needed with nausea and vomiting.  Patient should be aware of increased cost with the use of Ozempic, we are going to start at lower dose and titrate up 0.5 mg subQ weekly.  Side effects that are common with  Ozempic are usually abdominal cramping or pain however she may not experience these symptoms these are only commonly reported.

## 2023-07-05 ENCOUNTER — PATIENT MESSAGE (OUTPATIENT)
Dept: OBSTETRICS AND GYNECOLOGY | Facility: CLINIC | Age: 43
End: 2023-07-05
Payer: COMMERCIAL

## 2023-07-05 DIAGNOSIS — N30.00 ACUTE CYSTITIS WITHOUT HEMATURIA: Primary | ICD-10-CM

## 2023-07-05 RX ORDER — NITROFURANTOIN 25; 75 MG/1; MG/1
100 CAPSULE ORAL 2 TIMES DAILY
Qty: 10 CAPSULE | Refills: 0 | Status: SHIPPED | OUTPATIENT
Start: 2023-07-05 | End: 2023-07-10

## 2023-08-31 DIAGNOSIS — Z30.011 ENCOUNTER FOR INITIAL PRESCRIPTION OF CONTRACEPTIVE PILLS: ICD-10-CM

## 2023-09-01 RX ORDER — NORETHINDRONE ACETATE AND ETHINYL ESTRADIOL 1MG-20(21)
1 KIT ORAL
Qty: 30 TABLET | Refills: 0 | Status: SHIPPED | OUTPATIENT
Start: 2023-09-01 | End: 2023-09-20 | Stop reason: SDUPTHER

## 2023-09-20 ENCOUNTER — OFFICE VISIT (OUTPATIENT)
Dept: OBSTETRICS AND GYNECOLOGY | Facility: CLINIC | Age: 43
End: 2023-09-20
Payer: COMMERCIAL

## 2023-09-20 VITALS
BODY MASS INDEX: 38.27 KG/M2 | WEIGHT: 258.38 LBS | SYSTOLIC BLOOD PRESSURE: 142 MMHG | DIASTOLIC BLOOD PRESSURE: 78 MMHG | HEIGHT: 69 IN

## 2023-09-20 DIAGNOSIS — R51.9 CHRONIC NONINTRACTABLE HEADACHE, UNSPECIFIED HEADACHE TYPE: ICD-10-CM

## 2023-09-20 DIAGNOSIS — Z71.85 HPV VACCINE COUNSELING: ICD-10-CM

## 2023-09-20 DIAGNOSIS — Z30.41 ENCOUNTER FOR SURVEILLANCE OF CONTRACEPTIVE PILLS: Primary | ICD-10-CM

## 2023-09-20 DIAGNOSIS — N89.8 VAGINAL DRYNESS: ICD-10-CM

## 2023-09-20 DIAGNOSIS — G89.29 CHRONIC NONINTRACTABLE HEADACHE, UNSPECIFIED HEADACHE TYPE: ICD-10-CM

## 2023-09-20 PROCEDURE — 1159F PR MEDICATION LIST DOCUMENTED IN MEDICAL RECORD: ICD-10-PCS | Mod: CPTII,S$GLB,, | Performed by: STUDENT IN AN ORGANIZED HEALTH CARE EDUCATION/TRAINING PROGRAM

## 2023-09-20 PROCEDURE — 99214 PR OFFICE/OUTPT VISIT, EST, LEVL IV, 30-39 MIN: ICD-10-PCS | Mod: 25,S$GLB,, | Performed by: STUDENT IN AN ORGANIZED HEALTH CARE EDUCATION/TRAINING PROGRAM

## 2023-09-20 PROCEDURE — 3008F BODY MASS INDEX DOCD: CPT | Mod: CPTII,S$GLB,, | Performed by: STUDENT IN AN ORGANIZED HEALTH CARE EDUCATION/TRAINING PROGRAM

## 2023-09-20 PROCEDURE — 1160F RVW MEDS BY RX/DR IN RCRD: CPT | Mod: CPTII,S$GLB,, | Performed by: STUDENT IN AN ORGANIZED HEALTH CARE EDUCATION/TRAINING PROGRAM

## 2023-09-20 PROCEDURE — 99214 OFFICE O/P EST MOD 30 MIN: CPT | Mod: 25,S$GLB,, | Performed by: STUDENT IN AN ORGANIZED HEALTH CARE EDUCATION/TRAINING PROGRAM

## 2023-09-20 PROCEDURE — 3051F PR MOST RECENT HEMOGLOBIN A1C LEVEL 7.0 - < 8.0%: ICD-10-PCS | Mod: CPTII,S$GLB,, | Performed by: STUDENT IN AN ORGANIZED HEALTH CARE EDUCATION/TRAINING PROGRAM

## 2023-09-20 PROCEDURE — 3008F PR BODY MASS INDEX (BMI) DOCUMENTED: ICD-10-PCS | Mod: CPTII,S$GLB,, | Performed by: STUDENT IN AN ORGANIZED HEALTH CARE EDUCATION/TRAINING PROGRAM

## 2023-09-20 PROCEDURE — 3078F DIAST BP <80 MM HG: CPT | Mod: CPTII,S$GLB,, | Performed by: STUDENT IN AN ORGANIZED HEALTH CARE EDUCATION/TRAINING PROGRAM

## 2023-09-20 PROCEDURE — 90471 IMMUNIZATION ADMIN: CPT | Mod: S$GLB,,, | Performed by: STUDENT IN AN ORGANIZED HEALTH CARE EDUCATION/TRAINING PROGRAM

## 2023-09-20 PROCEDURE — 3077F PR MOST RECENT SYSTOLIC BLOOD PRESSURE >= 140 MM HG: ICD-10-PCS | Mod: CPTII,S$GLB,, | Performed by: STUDENT IN AN ORGANIZED HEALTH CARE EDUCATION/TRAINING PROGRAM

## 2023-09-20 PROCEDURE — 99999 PR PBB SHADOW E&M-EST. PATIENT-LVL III: ICD-10-PCS | Mod: PBBFAC,,, | Performed by: STUDENT IN AN ORGANIZED HEALTH CARE EDUCATION/TRAINING PROGRAM

## 2023-09-20 PROCEDURE — 99999 PR PBB SHADOW E&M-EST. PATIENT-LVL III: CPT | Mod: PBBFAC,,, | Performed by: STUDENT IN AN ORGANIZED HEALTH CARE EDUCATION/TRAINING PROGRAM

## 2023-09-20 PROCEDURE — 90651 HPV VACCINE 9-VALENT 3 DOSE IM: ICD-10-PCS | Mod: S$GLB,,, | Performed by: STUDENT IN AN ORGANIZED HEALTH CARE EDUCATION/TRAINING PROGRAM

## 2023-09-20 PROCEDURE — 1160F PR REVIEW ALL MEDS BY PRESCRIBER/CLIN PHARMACIST DOCUMENTED: ICD-10-PCS | Mod: CPTII,S$GLB,, | Performed by: STUDENT IN AN ORGANIZED HEALTH CARE EDUCATION/TRAINING PROGRAM

## 2023-09-20 PROCEDURE — 3078F PR MOST RECENT DIASTOLIC BLOOD PRESSURE < 80 MM HG: ICD-10-PCS | Mod: CPTII,S$GLB,, | Performed by: STUDENT IN AN ORGANIZED HEALTH CARE EDUCATION/TRAINING PROGRAM

## 2023-09-20 PROCEDURE — 3077F SYST BP >= 140 MM HG: CPT | Mod: CPTII,S$GLB,, | Performed by: STUDENT IN AN ORGANIZED HEALTH CARE EDUCATION/TRAINING PROGRAM

## 2023-09-20 PROCEDURE — 3051F HG A1C>EQUAL 7.0%<8.0%: CPT | Mod: CPTII,S$GLB,, | Performed by: STUDENT IN AN ORGANIZED HEALTH CARE EDUCATION/TRAINING PROGRAM

## 2023-09-20 PROCEDURE — 90651 9VHPV VACCINE 2/3 DOSE IM: CPT | Mod: S$GLB,,, | Performed by: STUDENT IN AN ORGANIZED HEALTH CARE EDUCATION/TRAINING PROGRAM

## 2023-09-20 PROCEDURE — 90471 HPV VACCINE 9-VALENT 3 DOSE IM: ICD-10-PCS | Mod: S$GLB,,, | Performed by: STUDENT IN AN ORGANIZED HEALTH CARE EDUCATION/TRAINING PROGRAM

## 2023-09-20 PROCEDURE — 1159F MED LIST DOCD IN RCRD: CPT | Mod: CPTII,S$GLB,, | Performed by: STUDENT IN AN ORGANIZED HEALTH CARE EDUCATION/TRAINING PROGRAM

## 2023-09-20 RX ORDER — NORETHINDRONE ACETATE AND ETHINYL ESTRADIOL 1MG-20(21)
1 KIT ORAL DAILY
Qty: 90 TABLET | Refills: 3 | Status: SHIPPED | OUTPATIENT
Start: 2023-09-20 | End: 2024-09-19

## 2023-09-20 RX ORDER — SEMAGLUTIDE 0.68 MG/ML
INJECTION, SOLUTION SUBCUTANEOUS
COMMUNITY
Start: 2023-09-04 | End: 2023-10-06

## 2023-09-20 RX ORDER — METFORMIN HYDROCHLORIDE 500 MG/1
TABLET, EXTENDED RELEASE ORAL
COMMUNITY
Start: 2023-09-17 | End: 2023-10-20

## 2023-09-20 NOTE — PROGRESS NOTES
[facility]    Subjective:     Chief Complaint:   Chief Complaint   Patient presents with    Follow-up     3 month f/u and Gardasil #1       Patient's last menstrual period was Patient's last menstrual period was 2023 (exact date).  Contraception: None.  HRT: None.    2023    Elvia Cheney 43 y.o. female  who presents for an annual exam.  She participates in regular exercise: gym with .  She does not smoke.  She wears seatbelts.  She is not taking a multivitamin, vitamin D, and calcium.  She denies any domestic violence.    She is interested in starting something for birth control she does not want any children.  Denies any issues with her cycle.  She does report chronic issues with headaches.  She has never been to a neurologist.  She is interested in seeing one.  Otherwise, denies any history of blood clots or strokes.    Last Pap: years ago. Denies any history of abnormal pap smears.  Last mammogram: 23. Results: normal--routine follow-up in 12 months.    FH:  Breast cancer: maternal grandmother.  Colon cancer: none.  Endometrial cancer: none.  Ovarian cancer: none.      2023  43-YO female presents as a 3 month follow up after being on OCP for 3 months. She reports tolerating the birth control pill well after the first month. She has some vaginal dryness and she would like treatment. Denies menopausal symptoms.         OB History    Para Term  AB Living   0 0 0 0 0 0   SAB IAB Ectopic Multiple Live Births   0 0 0 0 0       History reviewed. No pertinent past medical history.  History reviewed. No pertinent surgical history.  Review of patient's allergies indicates:   Allergen Reactions    Penicillins Hives       Social History     Socioeconomic History    Marital status:    Tobacco Use    Smoking status: Never    Smokeless tobacco: Never   Substance and Sexual Activity    Alcohol use: Not Currently    Drug use: Never    Sexual activity:  "Yes     Partners: Male     Birth control/protection: None       Family History   Problem Relation Age of Onset    Diabetes Maternal Grandmother     Hypertension Maternal Grandmother     Diabetes Paternal Grandfather     Hypertension Paternal Grandfather     Hyperthyroidism Maternal Aunt        Medications  Current Outpatient Medications on File Prior to Visit   Medication Sig Dispense Refill Last Dose    atorvastatin (LIPITOR) 20 MG tablet Take 1 tablet (20 mg total) by mouth once daily. 90 tablet 3 Taking    metFORMIN (GLUCOPHAGE-XR) 500 MG ER 24hr tablet Take by mouth.   Taking    OZEMPIC 0.25 mg or 0.5 mg (2 mg/3 mL) pen injector Inject into the skin.   Taking    [DISCONTINUED] norethindrone-ethinyl estradiol (BLISOVI FE 1/20, 28,) 1 mg-20 mcg (21)/75 mg (7) per tablet TAKE 1 TABLET BY MOUTH EVERY DAY. 30 tablet 0 Taking    ondansetron (ZOFRAN) 8 MG tablet Take 1 tablet (8 mg total) by mouth every 8 (eight) hours as needed for Nausea (For nausea and vomiting). (Patient not taking: Reported on 9/20/2023) 10 tablet 2 Not Taking    phentermine (ADIPEX-P) 37.5 mg tablet Take 37.5 mg by mouth every morning.          Review of Systems   Constitutional: Negative for appetite change, fever and unexpected weight change.   Respiratory: Negative for cough and shortness of breath.    Cardiovascular: Negative for chest pain and palpitations.   Gastrointestinal: Negative for abdominal distention, constipation, nausea and vomiting.   Genitourinary: Negative for dyspareunia, dysuria, hematuria and pelvic pain.        GYN ROS per HPI.   Musculoskeletal: Negative for gait problem and myalgias.   Skin: Negative for rash.   Neurological: Negative for dizziness, light-headedness and headaches.   Psychiatric/Behavioral: The patient is not nervous/anxious.      Objective:     BP (!) 142/78 (BP Location: Left arm, Patient Position: Sitting, BP Method: Large (Manual))   Ht 5' 9" (1.753 m)   Wt 117.2 kg (258 lb 6.1 oz)   LMP 09/16/2023 " (Exact Date)   BMI 38.16 kg/m²     Physical Exam  Constitutional:       General: She is not in acute distress.  HENT:      Head: Normocephalic.   Pulmonary:      Effort: Pulmonary effort is normal. No respiratory distress.   Neurological:      General: No focal deficit present.      Mental Status: She is alert.   Psychiatric:         Mood and Affect: Mood normal.         Behavior: Behavior normal.         Assessment:     1. Encounter for surveillance of contraceptive pills    2. Vaginal dryness    3. Chronic nonintractable headache, unspecified headache type      Plan:     1. Encounter for surveillance of contraceptive pills  - norethindrone-ethinyl estradiol (BLISOVI FE 1/20, 28,) 1 mg-20 mcg (21)/75 mg (7) per tablet; Take 1 tablet by mouth once daily.  Dispense: 90 tablet; Refill: 3    2. Vaginal dryness    3. Chronic nonintractable headache, unspecified headache type  - Had to cancel appt with neurology due to death of her uncle. Plans to schedule appointment from portal.     Follow up in about 3 months (around 12/20/2023).       The above was reviewed and discussed with the patient.    Annual exam and screening issues based on the patient's age and family history were discussed.     Since the OCP is working well for her, we will refill the medication for her to continue it. Rx sent to the pharmacy.       We discussed treatment for vaginal dryness which includes HRT and vaginal hyaluronic acid suppositories.     We discussed the pros, cons, risks, benefits, alternatives and indications of hormone replacement therapy in detail.  This included, but was not limited to cardiovascular and coronary artery disease issues such as stroke and heart attack, blood clotting issues including DVTs and PEs and cancerous and associations.  Short term and long term issues with hormone replacement therapy were discussed.    She would like to try the hyaluronic acid suppositories. Provided her with information regarding the  medication. Proper use, potential side effects and issues associated with medications prescribed were reviewed and discussed with the patient.      She was wanting the Gardasil at the last visit, and Gardasil dose given today.    The patient's questions were answered, and she agrees with the current plan.         Larisa Bray PA-C  09/20/2023

## 2023-09-25 ENCOUNTER — PATIENT MESSAGE (OUTPATIENT)
Dept: FAMILY MEDICINE | Facility: CLINIC | Age: 43
End: 2023-09-25

## 2023-09-25 ENCOUNTER — E-VISIT (OUTPATIENT)
Dept: FAMILY MEDICINE | Facility: CLINIC | Age: 43
End: 2023-09-25
Payer: COMMERCIAL

## 2023-09-25 DIAGNOSIS — J06.9 UPPER RESPIRATORY TRACT INFECTION, UNSPECIFIED TYPE: Primary | ICD-10-CM

## 2023-09-25 DIAGNOSIS — R05.9 COUGH, UNSPECIFIED TYPE: ICD-10-CM

## 2023-09-25 PROCEDURE — 99213 PR OFFICE/OUTPT VISIT, EST, LEVL III, 20-29 MIN: ICD-10-PCS | Mod: 95,,, | Performed by: STUDENT IN AN ORGANIZED HEALTH CARE EDUCATION/TRAINING PROGRAM

## 2023-09-25 PROCEDURE — 99213 OFFICE O/P EST LOW 20 MIN: CPT | Mod: 95,,, | Performed by: STUDENT IN AN ORGANIZED HEALTH CARE EDUCATION/TRAINING PROGRAM

## 2023-09-25 RX ORDER — BENZONATATE 200 MG/1
200 CAPSULE ORAL 3 TIMES DAILY PRN
Qty: 30 CAPSULE | Refills: 0 | Status: SHIPPED | OUTPATIENT
Start: 2023-09-25 | End: 2023-10-05

## 2023-09-25 NOTE — PROGRESS NOTES
Dear Elvia,     Thank you for reaching out to me for an e-visit so we can address your concern regarding: URI (Entered automatically based on patient selection in Patient Portal.)     I have reviewed your chart and read the answers to the questionnaire that you completed.  Based on the information provided, my diagnosis and recommendations are as follows:    Visit Diagnosis    1. Upper respiratory tract infection, unspecified type    2. Cough, unspecified type           Medications Ordered                [id number] - [address] AT [facility] & [address] [address]    Telephone: [phone]   Fax: [phone]   Hours: Open 24 hours                         E-Prescribed (1 of 1)              benzonatate (TESSALON) 200 MG capsule    Sig: Take 1 capsule (200 mg total) by mouth 3 (three) times daily as needed for Cough.       Start: 9/25/23     Quantity: 30 capsule Refills: 0                          If labs were performed, the following are the results:  No visits with results within 1 Day(s) from this visit.   Latest known visit with results is:   Office Visit on 06/09/2023   Component Date Value Ref Range Status    HPV DNA High Risk 06/09/2023 Not Detected  NOT DETECTED Final    Comment: Not Detected High Risk HPV types (16,18,31,33,35,39,45,51,52,  56,58,59,66,68) were not detected. Other HPV  types which cause anogenital lesions may be present.  The significance of the other types of HPV  in malignant processes has not been established.    Methodology: Real Time PCR                        TEST PERFORMED AT:  [facility]  [id number] [place] [id number]  AJAY BOLES MD,PHD      Cytology ThinPrep Pap Source 06/09/2023 Cervix   Final    Cytology ThinPrep Pap Report Status 06/09/2023 DNR   Final    Cytology Thinprep PAP Clinical His* 06/09/2023 Routine exam   Corrected    Cytology ThinPrep Pap LMP 06/09/2023  05/16/2023   Final    Cytology ThinPrep Previous PAP 06/09/2023 Unknown   Final    Cytology ThinPrep Previous Biopsy 06/09/2023 No   Final    Cytology ThinPrep PAP Adequacy 06/09/2023 SEE BELOW   Final    Comment: Satisfactory for evaluation. Endocervical/transformation zone   component   present.      Cytology ThinPrep PAP General Mary* 06/09/2023 DNR   Final    Cytology ThinPrep PAP Interpretati* 06/09/2023 SEE BELOW   Final    Negative for intraepithelial lesion or malignancy.    Cytology ThinPrep PAP Comment 06/09/2023 SEE BELOW   Final    This Pap test has been evaluated with computer assisted technology.    Cytotechnologist 06/09/2023 SEE BELOW   Final    Comment: YL, CT(ASCP) CT screening location: [facility] [address] [address]      Review Cytotechnologist 06/09/2023 DNR   Final    Pathologist 06/09/2023 DNR   Final    Cytology ThinPrep PAP Infection 06/09/2023 DNR   Final    Cytology Thin Prep Pap Explanation 06/09/2023 SEE BELOW   Final    Comment: EXPLANATORY NOTE:     The Pap is a screening test for cervical cancer. It is   not a diagnostic test and is subject to false negative   and false positive results. It is most reliable when a   satisfactory sample, regularly obtained, is submitted   with relevant clinical findings and history, and when   the Pap result is evaluated along with historic and   current clinical information.    TEST PERFORMED AT:  [facility] [address] [id number]  MARQUIS PABLO MD         Please let me know if there is something else that you need.  If you send additional messages concerning this illness, please use this message thread to communicate.      Paul Jones , DO

## 2023-09-25 NOTE — PROGRESS NOTES
Patient ID: Elvia Cheney is a 43 y.o. female.    Chief Complaint: URI (Entered automatically based on patient selection in Patient Portal.)    The patient initiated a request through [facility] on 9/25/2023 for evaluation and management with a chief complaint of URI (Entered automatically based on patient selection in Patient Portal.)     I evaluated the questionnaire submission on 9/25/2023.    [facility] Peq Evisit Upper Respitatory/Cough Questionnaire    9/25/2023  3:43 PM CDT - Filed by Patient   Do you agree to participate in an E-Visit? Yes   If you have any of the following symptoms, please present to your local ER or call 911:  I acknowledge   What is the main issue that you would like for your doctor to address today? Sore throat and congested   Are you able to take your vital signs? No   Are you currently pregnant, could you be pregnant, or are you breast feeding? None of the above   What symptoms do you currently have?  Cough;  Nasal Congestion;  Sore throat   Describe your cough: Bothersome (interferes with daily activities)   Have you had any of the following? Difficulty swallowing   Please enter a few details about your swallowing, breathing, or visual problems. Just hurts to swallow   Have you ever smoked? I have never smoked   Have you had a fever? No   When did your symptoms first appear? 9/23/2023   In the last two weeks, have you been in close contact with someone who has COVID-19 or the Flu? No   In the last two weeks, have you worked or volunteered in a healthcare facility or as a ? Healthcare facilities include a hospital, medical or dental clinic, long-term care facility, or nursing home No   Do you live in a long-term care facility, nursing home, group home, or homeless shelter? No   List what you have done or taken to help your symptoms. Used throat spray ,only helps temporarily   How severe are your symptoms? Moderate   Have your symptoms improved since they first appeared? No change    Have you taken an at home Covid test? Yes   What were the results? Negative   Have you taken a Flu test? Yes   What were the results? Negative   Have you been fully vaccinated for COVID? (2 Pfizer, 2 Moderna or 1 Alexandre & Alexandre vaccine injections) Yes   Have you received a booster? No   Have you recieved a Flu shot? No   Do you have transportation to get tested for COVID if it is indicated and ordered for you at an [facility] location? Yes   Provide any information you feel is important to your history not asked above    Please attach any relevant images or files          Recent Labs Obtained:  No visits with results within 7 Day(s) from this visit.   Latest known visit with results is:   Office Visit on 06/09/2023   Component Date Value Ref Range Status    HPV DNA High Risk 06/09/2023 Not Detected  NOT DETECTED Final    Comment: Not Detected High Risk HPV types (16,18,31,33,35,39,45,51,52,  56,58,59,66,68) were not detected. Other HPV  types which cause anogenital lesions may be present.  The significance of the other types of HPV  in malignant processes has not been established.    Methodology: Real Time PCR                        TEST PERFORMED AT:  "[facility] [address] [address]  AJAY BOLES MD,PHD      Cytology ThinPrep Pap Source 06/09/2023 Cervix   Final    Cytology ThinPrep Pap Report Status 06/09/2023 DNR   Final    Cytology Thinprep PAP Clinical His* 06/09/2023 Routine exam   Corrected    Cytology ThinPrep Pap LMP 06/09/2023 05/16/2023   Final    Cytology ThinPrep Previous PAP 06/09/2023 Unknown   Final    Cytology ThinPrep Previous Biopsy 06/09/2023 No   Final    Cytology ThinPrep PAP Adequacy 06/09/2023 SEE BELOW   Final    Comment: Satisfactory for evaluation. Endocervical/transformation zone   component   present.      Cytology ThinPrep PAP General Mary* 06/09/2023 DNR   Final    Cytology ThinPrep PAP Interpretati* 06/09/2023 SEE BELOW   Final     Negative for intraepithelial lesion or malignancy.    Cytology ThinPrep PAP Comment 06/09/2023 SEE BELOW   Final    This Pap test has been evaluated with computer assisted technology.    Cytotechnologist 06/09/2023 SEE BELOW   Final    Comment: YL, CT(ASCP) CT screening location: [facility] [place] [id number]      Review Cytotechnologist 06/09/2023 DNR   Final    Pathologist 06/09/2023 DNR   Final    Cytology ThinPrep PAP Infection 06/09/2023 DNR   Final    Cytology Thin Prep Pap Explanation 06/09/2023 SEE BELOW   Final    Comment: EXPLANATORY NOTE:     The Pap is a screening test for cervical cancer. It is   not a diagnostic test and is subject to false negative   and false positive results. It is most reliable when a   satisfactory sample, regularly obtained, is submitted   with relevant clinical findings and history, and when   the Pap result is evaluated along with historic and   current clinical information.    TEST PERFORMED AT:  [facility] [address] [address]  MARQUIS PABLO MD         Encounter Diagnoses   Name Primary?    Upper respiratory tract infection, unspecified type Yes    Cough, unspecified type         Orders Placed This Encounter   Procedures    Group A Strep, Molecular    POCT COVID-19 Rapid Screening     Order Specific Question:   Is the patient symptomatic?     Answer:   Yes    POCT Influenza A/B Rapid Antigen      Medications Ordered This Encounter   Medications    benzonatate (TESSALON) 200 MG capsule     Sig: Take 1 capsule (200 mg total) by mouth 3 (three) times daily as needed for Cough.     Dispense:  30 capsule     Refill:  0        No follow-ups on file.      E-Visit Time Tracking:    Day 1 Time (in minutes): 15     Total Time (in minutes): 15

## 2023-10-06 ENCOUNTER — PATIENT MESSAGE (OUTPATIENT)
Dept: FAMILY MEDICINE | Facility: CLINIC | Age: 43
End: 2023-10-06
Payer: COMMERCIAL

## 2023-10-06 DIAGNOSIS — E78.5 HYPERLIPIDEMIA ASSOCIATED WITH TYPE 2 DIABETES MELLITUS: ICD-10-CM

## 2023-10-06 DIAGNOSIS — E11.9 TYPE 2 DIABETES MELLITUS WITHOUT COMPLICATION, WITHOUT LONG-TERM CURRENT USE OF INSULIN: Primary | ICD-10-CM

## 2023-10-06 DIAGNOSIS — E11.69 HYPERLIPIDEMIA ASSOCIATED WITH TYPE 2 DIABETES MELLITUS: ICD-10-CM

## 2023-10-06 RX ORDER — SEMAGLUTIDE 1.34 MG/ML
1 INJECTION, SOLUTION SUBCUTANEOUS
Qty: 3 ML | Refills: 5 | Status: SHIPPED | OUTPATIENT
Start: 2023-10-06 | End: 2023-12-08

## 2023-10-06 RX ORDER — DEXTROSE 4 G
1 TABLET,CHEWABLE ORAL 2 TIMES DAILY
Qty: 1 EACH | Refills: 0 | Status: SHIPPED | OUTPATIENT
Start: 2023-10-06 | End: 2024-01-08 | Stop reason: SDUPTHER

## 2023-10-06 NOTE — TELEPHONE ENCOUNTER
Can we inform the patient that I would like for her to schedule an office visit to in for follow-up so that we can evaluate the effectiveness of therapy.  I would also ask that she is mindful of her blood glucose.  This is best checked with a glucometer and we can do this for at least twice per day to see the responsiveness to therapy.  Would need to check her A1c to see that she has in fact improved on Ozempic therapy

## 2023-10-20 ENCOUNTER — TELEPHONE (OUTPATIENT)
Dept: FAMILY MEDICINE | Facility: CLINIC | Age: 43
End: 2023-10-20
Payer: COMMERCIAL

## 2023-10-20 DIAGNOSIS — E11.9 TYPE 2 DIABETES MELLITUS WITHOUT COMPLICATION, WITHOUT LONG-TERM CURRENT USE OF INSULIN: Primary | ICD-10-CM

## 2023-10-20 RX ORDER — METFORMIN HYDROCHLORIDE 500 MG/1
500 TABLET ORAL 2 TIMES DAILY WITH MEALS
Qty: 180 TABLET | Refills: 3 | Status: SHIPPED | OUTPATIENT
Start: 2023-10-20 | End: 2023-12-08 | Stop reason: SINTOL

## 2023-10-20 NOTE — TELEPHONE ENCOUNTER
Metformin XR/ER is not covered by patient's insurance. Requests metformin hcl or metformin erosmotic.

## 2023-11-20 ENCOUNTER — PATIENT MESSAGE (OUTPATIENT)
Dept: FAMILY MEDICINE | Facility: CLINIC | Age: 43
End: 2023-11-20
Payer: COMMERCIAL

## 2023-11-20 DIAGNOSIS — J32.9 SINUSITIS, UNSPECIFIED CHRONICITY, UNSPECIFIED LOCATION: Primary | ICD-10-CM

## 2023-11-20 DIAGNOSIS — R09.81 NASAL CONGESTION: ICD-10-CM

## 2023-11-20 DIAGNOSIS — J32.9 SINUSITIS, UNSPECIFIED CHRONICITY, UNSPECIFIED LOCATION: ICD-10-CM

## 2023-11-20 RX ORDER — DOXYCYCLINE HYCLATE 100 MG
100 TABLET ORAL 2 TIMES DAILY
Qty: 14 TABLET | Refills: 0 | Status: SHIPPED | OUTPATIENT
Start: 2023-11-20 | End: 2023-11-20 | Stop reason: SDUPTHER

## 2023-11-20 RX ORDER — DOXYCYCLINE HYCLATE 100 MG
100 TABLET ORAL 2 TIMES DAILY
Qty: 14 TABLET | Refills: 0 | Status: SHIPPED | OUTPATIENT
Start: 2023-11-20 | End: 2023-11-27

## 2023-11-20 NOTE — TELEPHONE ENCOUNTER
----- Message from Addis Guerrero sent at 11/20/2023  2:01 PM CST -----  Contact: self      Type: RX Refill Request        Who Called: Patient   Refill or New Rx: new rx   RX Name and Strength: doxycycline (VIBRA-TABS) 100 MG tablet  How is the patient currently taking it? (ex. 1XDay): as directed   Is this a 30 day or 90 day RX: n/a  Preferred Pharmacy with phone number:   [facility]/pharmacy #[id number] - [facility] [address] [facility] [address] [place] [address] [facility] [address]  Phone: [phone] Fax: [phone]  Local or Mail Order: local   Ordering Provider: Dr. Jones   Best Call Back Number: [phone]  Additional Information: Plz send to pharmacy above. The first one was sent tot he wrong pharmacy  Thanks

## 2023-12-08 ENCOUNTER — OFFICE VISIT (OUTPATIENT)
Dept: FAMILY MEDICINE | Facility: CLINIC | Age: 43
End: 2023-12-08
Payer: COMMERCIAL

## 2023-12-08 ENCOUNTER — LAB VISIT (OUTPATIENT)
Dept: LAB | Facility: HOSPITAL | Age: 43
End: 2023-12-08
Attending: STUDENT IN AN ORGANIZED HEALTH CARE EDUCATION/TRAINING PROGRAM
Payer: COMMERCIAL

## 2023-12-08 VITALS
RESPIRATION RATE: 18 BRPM | OXYGEN SATURATION: 95 % | SYSTOLIC BLOOD PRESSURE: 138 MMHG | HEIGHT: 69 IN | DIASTOLIC BLOOD PRESSURE: 82 MMHG | BODY MASS INDEX: 38.44 KG/M2 | WEIGHT: 259.5 LBS | HEART RATE: 99 BPM | TEMPERATURE: 98 F

## 2023-12-08 DIAGNOSIS — E78.5 HYPERLIPIDEMIA ASSOCIATED WITH TYPE 2 DIABETES MELLITUS: ICD-10-CM

## 2023-12-08 DIAGNOSIS — E66.9 OBESITY (BMI 35.0-39.9 WITHOUT COMORBIDITY): ICD-10-CM

## 2023-12-08 DIAGNOSIS — E11.69 HYPERLIPIDEMIA ASSOCIATED WITH TYPE 2 DIABETES MELLITUS: ICD-10-CM

## 2023-12-08 DIAGNOSIS — R06.02 SHORTNESS OF BREATH: ICD-10-CM

## 2023-12-08 DIAGNOSIS — J30.2 SEASONAL ALLERGIES: ICD-10-CM

## 2023-12-08 DIAGNOSIS — E66.01 SEVERE OBESITY (BMI 35.0-39.9) WITH COMORBIDITY: ICD-10-CM

## 2023-12-08 DIAGNOSIS — E11.9 TYPE 2 DIABETES MELLITUS WITHOUT COMPLICATION, WITHOUT LONG-TERM CURRENT USE OF INSULIN: ICD-10-CM

## 2023-12-08 DIAGNOSIS — Z09 FOLLOW-UP EXAM: ICD-10-CM

## 2023-12-08 DIAGNOSIS — Z09 FOLLOW-UP EXAM: Primary | ICD-10-CM

## 2023-12-08 LAB
ALBUMIN SERPL BCP-MCNC: 3.9 G/DL (ref 3.5–5.2)
ALP SERPL-CCNC: 58 U/L (ref 55–135)
ALT SERPL W/O P-5'-P-CCNC: 44 U/L (ref 10–44)
ANION GAP SERPL CALC-SCNC: 10 MMOL/L (ref 8–16)
AST SERPL-CCNC: 35 U/L (ref 10–40)
BILIRUB SERPL-MCNC: 0.6 MG/DL (ref 0.1–1)
BUN SERPL-MCNC: 7 MG/DL (ref 6–20)
CALCIUM SERPL-MCNC: 9.8 MG/DL (ref 8.7–10.5)
CHLORIDE SERPL-SCNC: 103 MMOL/L (ref 95–110)
CHOLEST SERPL-MCNC: 211 MG/DL (ref 120–199)
CHOLEST/HDLC SERPL: 4.1 {RATIO} (ref 2–5)
CO2 SERPL-SCNC: 23 MMOL/L (ref 23–29)
CREAT SERPL-MCNC: 0.6 MG/DL (ref 0.5–1.4)
ERYTHROCYTE [DISTWIDTH] IN BLOOD BY AUTOMATED COUNT: 12.7 % (ref 11.5–14.5)
EST. GFR  (NO RACE VARIABLE): >60 ML/MIN/1.73 M^2
ESTIMATED AVG GLUCOSE: 163 MG/DL (ref 68–131)
GLUCOSE SERPL-MCNC: 153 MG/DL (ref 70–110)
HBA1C MFR BLD: 7.3 % (ref 4–5.6)
HCT VFR BLD AUTO: 44.8 % (ref 37–48.5)
HDLC SERPL-MCNC: 52 MG/DL (ref 40–75)
HDLC SERPL: 24.6 % (ref 20–50)
HGB BLD-MCNC: 14.7 G/DL (ref 12–16)
LDLC SERPL CALC-MCNC: 109.8 MG/DL (ref 63–159)
MCH RBC QN AUTO: 29.2 PG (ref 27–31)
MCHC RBC AUTO-ENTMCNC: 32.8 G/DL (ref 32–36)
MCV RBC AUTO: 89 FL (ref 82–98)
NONHDLC SERPL-MCNC: 159 MG/DL
PLATELET # BLD AUTO: 413 K/UL (ref 150–450)
PMV BLD AUTO: 9.5 FL (ref 9.2–12.9)
POTASSIUM SERPL-SCNC: 4.4 MMOL/L (ref 3.5–5.1)
PROT SERPL-MCNC: 8.1 G/DL (ref 6–8.4)
RBC # BLD AUTO: 5.03 M/UL (ref 4–5.4)
SODIUM SERPL-SCNC: 136 MMOL/L (ref 136–145)
TRIGL SERPL-MCNC: 246 MG/DL (ref 30–150)
TSH SERPL DL<=0.005 MIU/L-ACNC: 1.59 UIU/ML (ref 0.4–4)
WBC # BLD AUTO: 9.75 K/UL (ref 3.9–12.7)

## 2023-12-08 PROCEDURE — 99999 PR PBB SHADOW E&M-EST. PATIENT-LVL IV: CPT | Mod: PBBFAC,,, | Performed by: STUDENT IN AN ORGANIZED HEALTH CARE EDUCATION/TRAINING PROGRAM

## 2023-12-08 PROCEDURE — 99214 PR OFFICE/OUTPT VISIT, EST, LEVL IV, 30-39 MIN: ICD-10-PCS | Mod: S$GLB,,, | Performed by: STUDENT IN AN ORGANIZED HEALTH CARE EDUCATION/TRAINING PROGRAM

## 2023-12-08 PROCEDURE — 3079F PR MOST RECENT DIASTOLIC BLOOD PRESSURE 80-89 MM HG: ICD-10-PCS | Mod: CPTII,S$GLB,, | Performed by: STUDENT IN AN ORGANIZED HEALTH CARE EDUCATION/TRAINING PROGRAM

## 2023-12-08 PROCEDURE — 1159F PR MEDICATION LIST DOCUMENTED IN MEDICAL RECORD: ICD-10-PCS | Mod: CPTII,S$GLB,, | Performed by: STUDENT IN AN ORGANIZED HEALTH CARE EDUCATION/TRAINING PROGRAM

## 2023-12-08 PROCEDURE — 1159F MED LIST DOCD IN RCRD: CPT | Mod: CPTII,S$GLB,, | Performed by: STUDENT IN AN ORGANIZED HEALTH CARE EDUCATION/TRAINING PROGRAM

## 2023-12-08 PROCEDURE — 3075F PR MOST RECENT SYSTOLIC BLOOD PRESS GE 130-139MM HG: ICD-10-PCS | Mod: CPTII,S$GLB,, | Performed by: STUDENT IN AN ORGANIZED HEALTH CARE EDUCATION/TRAINING PROGRAM

## 2023-12-08 PROCEDURE — 36415 COLL VENOUS BLD VENIPUNCTURE: CPT | Mod: PO | Performed by: STUDENT IN AN ORGANIZED HEALTH CARE EDUCATION/TRAINING PROGRAM

## 2023-12-08 PROCEDURE — 99999 PR PBB SHADOW E&M-EST. PATIENT-LVL IV: ICD-10-PCS | Mod: PBBFAC,,, | Performed by: STUDENT IN AN ORGANIZED HEALTH CARE EDUCATION/TRAINING PROGRAM

## 2023-12-08 PROCEDURE — 3051F PR MOST RECENT HEMOGLOBIN A1C LEVEL 7.0 - < 8.0%: ICD-10-PCS | Mod: CPTII,S$GLB,, | Performed by: STUDENT IN AN ORGANIZED HEALTH CARE EDUCATION/TRAINING PROGRAM

## 2023-12-08 PROCEDURE — 3008F BODY MASS INDEX DOCD: CPT | Mod: CPTII,S$GLB,, | Performed by: STUDENT IN AN ORGANIZED HEALTH CARE EDUCATION/TRAINING PROGRAM

## 2023-12-08 PROCEDURE — 80061 LIPID PANEL: CPT | Performed by: STUDENT IN AN ORGANIZED HEALTH CARE EDUCATION/TRAINING PROGRAM

## 2023-12-08 PROCEDURE — 99214 OFFICE O/P EST MOD 30 MIN: CPT | Mod: S$GLB,,, | Performed by: STUDENT IN AN ORGANIZED HEALTH CARE EDUCATION/TRAINING PROGRAM

## 2023-12-08 PROCEDURE — 1160F RVW MEDS BY RX/DR IN RCRD: CPT | Mod: CPTII,S$GLB,, | Performed by: STUDENT IN AN ORGANIZED HEALTH CARE EDUCATION/TRAINING PROGRAM

## 2023-12-08 PROCEDURE — 3008F PR BODY MASS INDEX (BMI) DOCUMENTED: ICD-10-PCS | Mod: CPTII,S$GLB,, | Performed by: STUDENT IN AN ORGANIZED HEALTH CARE EDUCATION/TRAINING PROGRAM

## 2023-12-08 PROCEDURE — 3075F SYST BP GE 130 - 139MM HG: CPT | Mod: CPTII,S$GLB,, | Performed by: STUDENT IN AN ORGANIZED HEALTH CARE EDUCATION/TRAINING PROGRAM

## 2023-12-08 PROCEDURE — 1160F PR REVIEW ALL MEDS BY PRESCRIBER/CLIN PHARMACIST DOCUMENTED: ICD-10-PCS | Mod: CPTII,S$GLB,, | Performed by: STUDENT IN AN ORGANIZED HEALTH CARE EDUCATION/TRAINING PROGRAM

## 2023-12-08 PROCEDURE — 80053 COMPREHEN METABOLIC PANEL: CPT | Performed by: STUDENT IN AN ORGANIZED HEALTH CARE EDUCATION/TRAINING PROGRAM

## 2023-12-08 PROCEDURE — 3079F DIAST BP 80-89 MM HG: CPT | Mod: CPTII,S$GLB,, | Performed by: STUDENT IN AN ORGANIZED HEALTH CARE EDUCATION/TRAINING PROGRAM

## 2023-12-08 PROCEDURE — 3051F HG A1C>EQUAL 7.0%<8.0%: CPT | Mod: CPTII,S$GLB,, | Performed by: STUDENT IN AN ORGANIZED HEALTH CARE EDUCATION/TRAINING PROGRAM

## 2023-12-08 PROCEDURE — 84443 ASSAY THYROID STIM HORMONE: CPT | Performed by: STUDENT IN AN ORGANIZED HEALTH CARE EDUCATION/TRAINING PROGRAM

## 2023-12-08 PROCEDURE — 83036 HEMOGLOBIN GLYCOSYLATED A1C: CPT | Performed by: STUDENT IN AN ORGANIZED HEALTH CARE EDUCATION/TRAINING PROGRAM

## 2023-12-08 PROCEDURE — 85027 COMPLETE CBC AUTOMATED: CPT | Performed by: STUDENT IN AN ORGANIZED HEALTH CARE EDUCATION/TRAINING PROGRAM

## 2023-12-08 RX ORDER — METFORMIN HYDROCHLORIDE 500 MG/1
500 TABLET, EXTENDED RELEASE ORAL 2 TIMES DAILY WITH MEALS
Qty: 180 TABLET | Refills: 3 | Status: SHIPPED | OUTPATIENT
Start: 2023-12-08 | End: 2024-03-12

## 2023-12-08 RX ORDER — ALBUTEROL SULFATE 90 UG/1
1 AEROSOL, METERED RESPIRATORY (INHALATION) EVERY 4 HOURS PRN
Qty: 18 G | Refills: 3 | Status: SHIPPED | OUTPATIENT
Start: 2023-12-08

## 2023-12-08 RX ORDER — ALBUTEROL SULFATE 90 UG/1
1 AEROSOL, METERED RESPIRATORY (INHALATION) EVERY 4 HOURS PRN
COMMUNITY
Start: 2023-11-23 | End: 2023-12-08 | Stop reason: SDUPTHER

## 2023-12-08 RX ORDER — ONDANSETRON 8 MG/1
8 TABLET, ORALLY DISINTEGRATING ORAL 2 TIMES DAILY
Qty: 60 TABLET | Refills: 0 | Status: SHIPPED | OUTPATIENT
Start: 2023-12-08 | End: 2024-01-10

## 2023-12-08 RX ORDER — FLUTICASONE PROPIONATE 50 MCG
1 SPRAY, SUSPENSION (ML) NASAL DAILY
Qty: 16 G | Refills: 3 | Status: SHIPPED | OUTPATIENT
Start: 2023-12-08

## 2023-12-08 RX ORDER — TIRZEPATIDE 2.5 MG/.5ML
2.5 INJECTION, SOLUTION SUBCUTANEOUS
Qty: 4 PEN | Refills: 0 | Status: SHIPPED | OUTPATIENT
Start: 2023-12-08 | End: 2024-01-04 | Stop reason: SDUPTHER

## 2023-12-08 RX ORDER — TIRZEPATIDE 5 MG/.5ML
5 INJECTION, SOLUTION SUBCUTANEOUS
Qty: 4 PEN | Refills: 0 | Status: SHIPPED | OUTPATIENT
Start: 2023-12-08 | End: 2024-01-31 | Stop reason: SDUPTHER

## 2023-12-08 NOTE — PROGRESS NOTES
SUBJECTIVE:    CHIEF COMPLAINT:   Chief Complaint   Patient presents with    Follow-up     6 month f/u    Cough    Nasal Congestion           274}    HISTORY OF PRESENT ILLNESS:  Elvia Cheney is a 43 y.o. female Past medical history, Type 2 Diabetes, HLD, and  hypertension who presents to the clinic for followup. She reports continued nausea and GI upset with Metformin and Ozempic. She would like to try an alternative medication to assist with DM control and wt loss.     Discuss alternative medication (Mounjaro) and ER Metformin, to which she is agreeable to care plan. Discussed risks with use of medication and she would like to proceed. Denies any fevers, chills, chest pain, shortness the breath, nausea vomiting or diarrhea       PAST MEDICAL HISTORY:     274}  History reviewed. No pertinent past medical history.    PAST SURGICAL HISTORY:  History reviewed. No pertinent surgical history.    SOCIAL HISTORY:  Social History     Socioeconomic History    Marital status:    Tobacco Use    Smoking status: Never    Smokeless tobacco: Never   Substance and Sexual Activity    Alcohol use: Not Currently    Drug use: Never    Sexual activity: Yes     Partners: Male     Birth control/protection: None       FAMILY HISTORY:       Family History   Problem Relation Age of Onset    Diabetes Maternal Grandmother     Hypertension Maternal Grandmother     Diabetes Paternal Grandfather     Hypertension Paternal Grandfather     Hyperthyroidism Maternal Aunt        ALLERGIES AND MEDICATIONS: updated and reviewed.      274}  Review of patient's allergies indicates:   Allergen Reactions    Penicillins Hives     Medication List with Changes/Refills   New Medications    FLUTICASONE PROPIONATE (FLONASE) 50 MCG/ACTUATION NASAL SPRAY    1 spray (50 mcg total) by Each Nostril route once daily.    METFORMIN (GLUCOPHAGE-XR) 500 MG ER 24HR TABLET    Take 1 tablet (500 mg total) by mouth 2 (two) times daily with meals.    ONDANSETRON  (ZOFRAN-ODT) 8 MG TBDL    Take 1 tablet (8 mg total) by mouth 2 (two) times daily.    TIRZEPATIDE (MOUNJARO) 2.5 MG/0.5 ML PNIJ    Inject 2.5 mg into the skin every 7 days.    TIRZEPATIDE (MOUNJARO) 2.5 MG/0.5 ML PNIJ    Inject 5 mg into the skin every 7 days.   Current Medications    ATORVASTATIN (LIPITOR) 20 MG TABLET    Take 1 tablet (20 mg total) by mouth once daily.    BLOOD SUGAR DIAGNOSTIC STRP    1 strip by Misc.(Non-Drug; Combo Route) route 2 (two) times a day. Use with meter to check blood glucose.    BLOOD-GLUCOSE METER KIT    1 each by Other route 2 (two) times a day. Use as instructed with strips    NORETHINDRONE-ETHINYL ESTRADIOL (BLISOVI FE 1/20, 28,) 1 MG-20 MCG (21)/75 MG (7) PER TABLET    Take 1 tablet by mouth once daily.   Changed and/or Refilled Medications    Modified Medication Previous Medication    ALBUTEROL (PROVENTIL/VENTOLIN HFA) 90 MCG/ACTUATION INHALER albuterol (PROVENTIL/VENTOLIN HFA) 90 mcg/actuation inhaler       Inhale 1 puff into the lungs every 4 (four) hours as needed for Shortness of Breath.    Inhale 1 puff into the lungs every 4 (four) hours as needed for Shortness of Breath.   Discontinued Medications    METFORMIN (GLUCOPHAGE) 500 MG TABLET    Take 1 tablet (500 mg total) by mouth 2 (two) times daily with meals.    SEMAGLUTIDE (OZEMPIC) 1 MG/DOSE (4 MG/3 ML)    Inject 1 mg into the skin every 7 days.       SCREENING HISTORY:    274}  Health Maintenance         Date Due Completion Date    Diabetes Urine Screening Never done ---    Pneumococcal Vaccines (Age 0-64) (1 - PCV) Never done ---    Foot Exam Never done ---    Eye Exam Never done ---    TETANUS VACCINE Never done ---    Influenza Vaccine (1) Never done ---    COVID-19 Vaccine (3 - 2023-24 season) 09/01/2023 11/8/2021    Mammogram 06/08/2024 6/8/2023    Hemoglobin A1c 06/08/2024 12/8/2023    Low Dose Statin 09/20/2024 9/20/2023    Lipid Panel 12/08/2024 12/8/2023    Cervical Cancer Screening 06/09/2028 6/9/2023       "      REVIEW OF SYSTEMS:   Review of Systems   All other systems reviewed and are negative.      PHYSICAL EXAM:      274}  /82 (BP Location: Right arm, Patient Position: Sitting, BP Method: Large (Manual))   Pulse 99   Temp 97.9 °F (36.6 °C) (Oral)   Resp 18   Ht 5' 9" (1.753 m)   Wt 117.7 kg (259 lb 7.7 oz)   LMP 11/23/2023 (Exact Date)   SpO2 95%   BMI 38.32 kg/m²   Wt Readings from Last 3 Encounters:   12/08/23 117.7 kg (259 lb 7.7 oz)   09/20/23 117.2 kg (258 lb 6.1 oz)   06/09/23 120 kg (264 lb 8.8 oz)     BP Readings from Last 3 Encounters:   12/08/23 138/82   09/20/23 (!) 142/78   06/09/23 (!) 148/76     Estimated body mass index is 38.32 kg/m² as calculated from the following:    Height as of this encounter: 5' 9" (1.753 m).    Weight as of this encounter: 117.7 kg (259 lb 7.7 oz).     Physical Exam  Vitals reviewed.   Constitutional:       General: She is not in acute distress.     Appearance: Normal appearance. She is well-developed. She is obese. She is not ill-appearing.   HENT:      Head: Normocephalic and atraumatic.      Right Ear: External ear normal.      Left Ear: External ear normal.      Nose: Congestion and rhinorrhea present.   Eyes:      Extraocular Movements: Extraocular movements intact.      Conjunctiva/sclera: Conjunctivae normal.      Pupils: Pupils are equal, round, and reactive to light.   Neck:      Thyroid: No thyroid mass.   Cardiovascular:      Rate and Rhythm: Normal rate and regular rhythm.      Pulses: Normal pulses.      Heart sounds: Normal heart sounds, S1 normal and S2 normal. No murmur heard.     No gallop.   Pulmonary:      Effort: Pulmonary effort is normal. No respiratory distress.      Breath sounds: Normal breath sounds and air entry. No stridor. No wheezing, rhonchi or rales.   Abdominal:      General: There is no distension.      Palpations: Abdomen is soft. There is no mass.      Tenderness: There is no abdominal tenderness.   Musculoskeletal:         " General: No swelling or deformity. Normal range of motion.      Cervical back: Normal range of motion. No edema or erythema.   Skin:     General: Skin is warm and dry.      Findings: No lesion or rash.   Neurological:      Mental Status: She is alert and oriented to person, place, and time. Mental status is at baseline.   Psychiatric:         Mood and Affect: Mood normal.         Behavior: Behavior normal. Behavior is cooperative.         Judgment: Judgment normal.         LABS:   274}  I have reviewed old labs below:  Lab Results   Component Value Date    WBC 9.75 12/08/2023    HGB 14.7 12/08/2023    HCT 44.8 12/08/2023    MCV 89 12/08/2023     12/08/2023     12/08/2023    K 4.4 12/08/2023     12/08/2023    CALCIUM 9.8 12/08/2023    CO2 23 12/08/2023     (H) 12/08/2023    BUN 7 12/08/2023    CREATININE 0.6 12/08/2023    ANIONGAP 10 12/08/2023    PROT 8.1 12/08/2023    ALBUMIN 3.9 12/08/2023    BILITOT 0.6 12/08/2023    ALKPHOS 58 12/08/2023    ALT 44 12/08/2023    AST 35 12/08/2023    CHOL 211 (H) 12/08/2023    TRIG 246 (H) 12/08/2023    HDL 52 12/08/2023    LDLCALC 109.8 12/08/2023    TSH 1.589 12/08/2023    HGBA1C 7.3 (H) 12/08/2023       ASSESSMENT AND PLAN:  274}  1. Follow-up exam  -     TSH; Future; Expected date: 01/08/2024  -     Lipid Panel; Future; Expected date: 01/08/2024  -     Hemoglobin A1C; Future; Expected date: 12/22/2023  -     Comprehensive Metabolic Panel; Future; Expected date: 01/08/2024  -     CBC Without Differential; Future; Expected date: 01/08/2024    2. Type 2 diabetes mellitus without complication, without long-term current use of insulin  -     Lipid Panel; Future; Expected date: 01/08/2024  -     Hemoglobin A1C; Future; Expected date: 12/22/2023  -     Ambulatory referral/consult to Diabetes Education; Future; Expected date: 12/15/2023  -     tirzepatide (MOUNJARO) 2.5 mg/0.5 mL PnIj; Inject 2.5 mg into the skin every 7 days.  Dispense: 4 pen ; Refill: 0  -      tirzepatide (MOUNJARO) 2.5 mg/0.5 mL PnIj; Inject 5 mg into the skin every 7 days.  Dispense: 8 pen ; Refill: 0  -     ondansetron (ZOFRAN-ODT) 8 MG TbDL; Take 1 tablet (8 mg total) by mouth 2 (two) times daily.  Dispense: 60 tablet; Refill: 0  -     metFORMIN (GLUCOPHAGE-XR) 500 MG ER 24hr tablet; Take 1 tablet (500 mg total) by mouth 2 (two) times daily with meals.  Dispense: 180 tablet; Refill: 3    3. Severe obesity (BMI 35.0-39.9) with comorbidity    4. Hyperlipidemia associated with type 2 diabetes mellitus  -     Lipid Panel; Future; Expected date: 01/08/2024  -     Hemoglobin A1C; Future; Expected date: 12/22/2023    5. Shortness of breath  -     albuterol (PROVENTIL/VENTOLIN HFA) 90 mcg/actuation inhaler; Inhale 1 puff into the lungs every 4 (four) hours as needed for Shortness of Breath.  Dispense: 18 g; Refill: 3    6. Seasonal allergies  -     fluticasone propionate (FLONASE) 50 mcg/actuation nasal spray; 1 spray (50 mcg total) by Each Nostril route once daily.  Dispense: 16 g; Refill: 3    7. Obesity (BMI 35.0-39.9 without comorbidity)  Comments:  Recommended Low fat/low carb diet. Exercise 3-4x per week for 30-40mins.  Orders:  -     TSH; Future; Expected date: 01/08/2024  -     Hemoglobin A1C; Future; Expected date: 12/22/2023  -     tirzepatide (MOUNJARO) 2.5 mg/0.5 mL PnIj; Inject 2.5 mg into the skin every 7 days.  Dispense: 4 pen ; Refill: 0  -     tirzepatide (MOUNJARO) 2.5 mg/0.5 mL PnIj; Inject 5 mg into the skin every 7 days.  Dispense: 8 pen ; Refill: 0           Orders Placed This Encounter   Procedures    TSH    Lipid Panel    Hemoglobin A1C    Comprehensive Metabolic Panel    CBC Without Differential    Ambulatory referral/consult to Diabetes Education       Follow up in about 3 months (around 3/8/2024). or sooner as needed.

## 2023-12-13 DIAGNOSIS — E11.9 TYPE 2 DIABETES MELLITUS WITHOUT COMPLICATION: ICD-10-CM

## 2023-12-18 ENCOUNTER — PATIENT MESSAGE (OUTPATIENT)
Dept: ADMINISTRATIVE | Facility: HOSPITAL | Age: 43
End: 2023-12-18
Payer: COMMERCIAL

## 2023-12-20 ENCOUNTER — CLINICAL SUPPORT (OUTPATIENT)
Dept: DIABETES | Facility: CLINIC | Age: 43
End: 2023-12-20
Payer: COMMERCIAL

## 2023-12-20 ENCOUNTER — OFFICE VISIT (OUTPATIENT)
Dept: OBSTETRICS AND GYNECOLOGY | Facility: CLINIC | Age: 43
End: 2023-12-20
Payer: COMMERCIAL

## 2023-12-20 VITALS
SYSTOLIC BLOOD PRESSURE: 150 MMHG | HEART RATE: 88 BPM | HEIGHT: 69 IN | WEIGHT: 261.25 LBS | BODY MASS INDEX: 38.69 KG/M2 | DIASTOLIC BLOOD PRESSURE: 83 MMHG

## 2023-12-20 DIAGNOSIS — N89.8 VAGINAL DRYNESS: Primary | ICD-10-CM

## 2023-12-20 DIAGNOSIS — Z71.85 HPV VACCINE COUNSELING: ICD-10-CM

## 2023-12-20 DIAGNOSIS — E11.9 TYPE 2 DIABETES MELLITUS WITHOUT COMPLICATION, WITHOUT LONG-TERM CURRENT USE OF INSULIN: ICD-10-CM

## 2023-12-20 PROCEDURE — 99999 PR PBB SHADOW E&M-EST. PATIENT-LVL III: ICD-10-PCS | Mod: PBBFAC,,, | Performed by: STUDENT IN AN ORGANIZED HEALTH CARE EDUCATION/TRAINING PROGRAM

## 2023-12-20 PROCEDURE — 99213 PR OFFICE/OUTPT VISIT, EST, LEVL III, 20-29 MIN: ICD-10-PCS | Mod: 25,S$GLB,, | Performed by: STUDENT IN AN ORGANIZED HEALTH CARE EDUCATION/TRAINING PROGRAM

## 2023-12-20 PROCEDURE — 3077F SYST BP >= 140 MM HG: CPT | Mod: CPTII,S$GLB,, | Performed by: STUDENT IN AN ORGANIZED HEALTH CARE EDUCATION/TRAINING PROGRAM

## 2023-12-20 PROCEDURE — 99999 PR PBB SHADOW E&M-EST. PATIENT-LVL III: ICD-10-PCS | Mod: PBBFAC,,, | Performed by: NUTRITIONIST

## 2023-12-20 PROCEDURE — 1159F PR MEDICATION LIST DOCUMENTED IN MEDICAL RECORD: ICD-10-PCS | Mod: CPTII,S$GLB,, | Performed by: STUDENT IN AN ORGANIZED HEALTH CARE EDUCATION/TRAINING PROGRAM

## 2023-12-20 PROCEDURE — 3051F HG A1C>EQUAL 7.0%<8.0%: CPT | Mod: CPTII,S$GLB,, | Performed by: STUDENT IN AN ORGANIZED HEALTH CARE EDUCATION/TRAINING PROGRAM

## 2023-12-20 PROCEDURE — G0108 DIAB MANAGE TRN  PER INDIV: HCPCS | Mod: S$GLB,,, | Performed by: NUTRITIONIST

## 2023-12-20 PROCEDURE — 3008F BODY MASS INDEX DOCD: CPT | Mod: CPTII,S$GLB,, | Performed by: STUDENT IN AN ORGANIZED HEALTH CARE EDUCATION/TRAINING PROGRAM

## 2023-12-20 PROCEDURE — 3079F DIAST BP 80-89 MM HG: CPT | Mod: CPTII,S$GLB,, | Performed by: STUDENT IN AN ORGANIZED HEALTH CARE EDUCATION/TRAINING PROGRAM

## 2023-12-20 PROCEDURE — 99999 PR PBB SHADOW E&M-EST. PATIENT-LVL III: CPT | Mod: PBBFAC,,, | Performed by: STUDENT IN AN ORGANIZED HEALTH CARE EDUCATION/TRAINING PROGRAM

## 2023-12-20 PROCEDURE — 99999 PR PBB SHADOW E&M-EST. PATIENT-LVL III: CPT | Mod: PBBFAC,,, | Performed by: NUTRITIONIST

## 2023-12-20 PROCEDURE — 3077F PR MOST RECENT SYSTOLIC BLOOD PRESSURE >= 140 MM HG: ICD-10-PCS | Mod: CPTII,S$GLB,, | Performed by: STUDENT IN AN ORGANIZED HEALTH CARE EDUCATION/TRAINING PROGRAM

## 2023-12-20 PROCEDURE — 3079F PR MOST RECENT DIASTOLIC BLOOD PRESSURE 80-89 MM HG: ICD-10-PCS | Mod: CPTII,S$GLB,, | Performed by: STUDENT IN AN ORGANIZED HEALTH CARE EDUCATION/TRAINING PROGRAM

## 2023-12-20 PROCEDURE — 3008F PR BODY MASS INDEX (BMI) DOCUMENTED: ICD-10-PCS | Mod: CPTII,S$GLB,, | Performed by: STUDENT IN AN ORGANIZED HEALTH CARE EDUCATION/TRAINING PROGRAM

## 2023-12-20 PROCEDURE — 90471 HPV VACCINE 9-VALENT 3 DOSE IM: ICD-10-PCS | Mod: S$GLB,,, | Performed by: STUDENT IN AN ORGANIZED HEALTH CARE EDUCATION/TRAINING PROGRAM

## 2023-12-20 PROCEDURE — 90471 IMMUNIZATION ADMIN: CPT | Mod: S$GLB,,, | Performed by: STUDENT IN AN ORGANIZED HEALTH CARE EDUCATION/TRAINING PROGRAM

## 2023-12-20 PROCEDURE — 1160F PR REVIEW ALL MEDS BY PRESCRIBER/CLIN PHARMACIST DOCUMENTED: ICD-10-PCS | Mod: CPTII,S$GLB,, | Performed by: STUDENT IN AN ORGANIZED HEALTH CARE EDUCATION/TRAINING PROGRAM

## 2023-12-20 PROCEDURE — 3051F PR MOST RECENT HEMOGLOBIN A1C LEVEL 7.0 - < 8.0%: ICD-10-PCS | Mod: CPTII,S$GLB,, | Performed by: STUDENT IN AN ORGANIZED HEALTH CARE EDUCATION/TRAINING PROGRAM

## 2023-12-20 PROCEDURE — 99213 OFFICE O/P EST LOW 20 MIN: CPT | Mod: 25,S$GLB,, | Performed by: STUDENT IN AN ORGANIZED HEALTH CARE EDUCATION/TRAINING PROGRAM

## 2023-12-20 PROCEDURE — G0108 PR DIAB MANAGE TRN  PER INDIV: ICD-10-PCS | Mod: S$GLB,,, | Performed by: NUTRITIONIST

## 2023-12-20 PROCEDURE — 90651 9VHPV VACCINE 2/3 DOSE IM: CPT | Mod: S$GLB,,, | Performed by: STUDENT IN AN ORGANIZED HEALTH CARE EDUCATION/TRAINING PROGRAM

## 2023-12-20 PROCEDURE — 1159F MED LIST DOCD IN RCRD: CPT | Mod: CPTII,S$GLB,, | Performed by: STUDENT IN AN ORGANIZED HEALTH CARE EDUCATION/TRAINING PROGRAM

## 2023-12-20 PROCEDURE — 1160F RVW MEDS BY RX/DR IN RCRD: CPT | Mod: CPTII,S$GLB,, | Performed by: STUDENT IN AN ORGANIZED HEALTH CARE EDUCATION/TRAINING PROGRAM

## 2023-12-20 PROCEDURE — 90651 HPV VACCINE 9-VALENT 3 DOSE IM: ICD-10-PCS | Mod: S$GLB,,, | Performed by: STUDENT IN AN ORGANIZED HEALTH CARE EDUCATION/TRAINING PROGRAM

## 2023-12-20 RX ORDER — CETIRIZINE HYDROCHLORIDE 10 MG/1
10 TABLET ORAL
COMMUNITY
Start: 2023-11-27

## 2023-12-20 NOTE — PROGRESS NOTES
[facility]    Subjective:     Chief Complaint:   Chief Complaint   Patient presents with    Follow-up     3 month & Gardasil #2       Patient's last menstrual period was Patient's last menstrual period was 2023 (exact date).  Contraception: None.  HRT: None.    2023  43-YO female presents as a 3 month follow up after being on OCP for 3 months. She reports tolerating the birth control pill well after the first month. She has some vaginal dryness and she would like treatment. Denies menopausal symptoms.       2023  43-YO female presents as a 3 month follow up after trying hyaluronic acid suppository for vaginal dryness. She did not like using the suppository and stopped it. She reports symptoms have resolved.     She has been on antibiotics recently and stopped her OCP while on antibiotics. She plans to restart them once she completes antibiotics.       OB History    Para Term  AB Living   0 0 0 0 0 0   SAB IAB Ectopic Multiple Live Births   0 0 0 0 0       History reviewed. No pertinent past medical history.  History reviewed. No pertinent surgical history.  Review of patient's allergies indicates:   Allergen Reactions    Penicillins Hives       Social History     Socioeconomic History    Marital status:    Tobacco Use    Smoking status: Never    Smokeless tobacco: Never   Substance and Sexual Activity    Alcohol use: Not Currently    Drug use: Never    Sexual activity: Yes     Partners: Male     Birth control/protection: None       Family History   Problem Relation Age of Onset    Diabetes Maternal Grandmother     Hypertension Maternal Grandmother     Diabetes Paternal Grandfather     Hypertension Paternal Grandfather     Hyperthyroidism Maternal Aunt        Medications  Current Outpatient Medications on File Prior to Visit   Medication Sig Dispense Refill Last Dose    albuterol (PROVENTIL/VENTOLIN HFA) 90 mcg/actuation inhaler Inhale 1 puff into the  lungs every 4 (four) hours as needed for Shortness of Breath. 18 g 3 Taking    atorvastatin (LIPITOR) 20 MG tablet Take 1 tablet (20 mg total) by mouth once daily. 90 tablet 3 Taking    blood sugar diagnostic Strp 1 strip by Misc.(Non-Drug; Combo Route) route 2 (two) times a day. Use with meter to check blood glucose. 200 strip 2 Taking    blood-glucose meter kit 1 each by Other route 2 (two) times a day. Use as instructed with strips 1 each 1 Taking    cetirizine (ZYRTEC) 10 MG tablet Take 10 mg by mouth.   Taking    fluticasone propionate (FLONASE) 50 mcg/actuation nasal spray 1 spray (50 mcg total) by Each Nostril route once daily. 16 g 3 Taking    metFORMIN (GLUCOPHAGE-XR) 500 MG ER 24hr tablet Take 1 tablet (500 mg total) by mouth 2 (two) times daily with meals. 180 tablet 3 Taking    tirzepatide (MOUNJARO) 2.5 mg/0.5 mL PnIj Inject 2.5 mg into the skin every 7 days. 4 pen 0 Taking    tirzepatide (MOUNJARO) 2.5 mg/0.5 mL PnIj Inject 5 mg into the skin every 7 days. 8 pen 0 Taking    norethindrone-ethinyl estradiol (BLISOVI FE 1/20, 28,) 1 mg-20 mcg (21)/75 mg (7) per tablet Take 1 tablet by mouth once daily. (Patient not taking: Reported on 12/20/2023) 90 tablet 3 Not Taking    ondansetron (ZOFRAN-ODT) 8 MG TbDL Take 1 tablet (8 mg total) by mouth 2 (two) times daily. (Patient not taking: Reported on 12/20/2023) 60 tablet 0 Not Taking       Review of Systems   Constitutional: Negative for appetite change, fever and unexpected weight change.   Respiratory: Negative for cough and shortness of breath.    Cardiovascular: Negative for chest pain and palpitations.   Gastrointestinal: Negative for abdominal distention, constipation, nausea and vomiting.   Genitourinary: Negative for dyspareunia, dysuria, hematuria and pelvic pain.        GYN ROS per HPI.   Musculoskeletal: Negative for gait problem and myalgias.   Skin: Negative for rash.   Neurological: Negative for dizziness, light-headedness and headaches.  "  Psychiatric/Behavioral: The patient is not nervous/anxious.      Objective:     BP (!) 150/83 (BP Location: Right arm, Patient Position: Sitting, BP Method: Large (Automatic))   Pulse 88   Ht 5' 9" (1.753 m)   Wt 118.5 kg (261 lb 3.9 oz)   LMP 11/23/2023 (Exact Date)   BMI 38.58 kg/m²     Physical Exam  Constitutional:       General: She is not in acute distress.  HENT:      Head: Normocephalic.   Pulmonary:      Effort: Pulmonary effort is normal. No respiratory distress.   Neurological:      General: No focal deficit present.      Mental Status: She is alert.   Psychiatric:         Mood and Affect: Mood normal.         Behavior: Behavior normal.         Assessment:     1. Vaginal dryness    2. HPV vaccine counseling      Plan:     1. Vaginal dryness    2. HPV vaccine counseling  - (In Office Administered) HPV Vaccine (9-Valent) (3 Dose) (IM)  - Had to cancel appt with neurology due to death of her uncle. Plans to schedule appointment from portal.     Follow up in about 6 months (around 6/10/2024) for annual exam or sooner if any GYN issues arise.       The above was reviewed and discussed with the patient.      Vaginal dryness has resolved. Follow up if symptoms return.   Restart OCP once finished with antibiotics.      Gardasil dose 2 given today.    The patient's questions were answered, and she agrees with the current plan.         Larisa Bray PA-C  12/20/2023    "

## 2023-12-20 NOTE — PROGRESS NOTES
Diabetes Care Specialist Progress Note  Author: Sally Carrington RD  Date: 12/20/2023    Referral 12/8/23     Program Intake  Reason for Diabetes Program Visit:: Initial Diabetes Assessment  Current diabetes risk level:: low  In the last 12 months, have you:: none  Permission to speak with others about care:: no    Lab Results   Component Value Date    HGBA1C 7.3 (H) 12/08/2023       Clinical    Patient Health Rating  Compared to other people your age, how would you rate your health?: Good    Problem Review  Reviewed Problem List with Patient: yes  Active comorbidities affecting diabetes self-care.: yes  Comorbidities: Hypertension, Cardiovascular Disease  Reviewed health maintenance: yes    Clinical Assessment  Current Diabetes Treatment: Oral Medication, Injectable (.Metformin 500 mg BID--taking XR only at b'fast; Mounjaro 2.5 mg on SUN)  Have you ever experienced hypoglycemia (low blood sugar)?: no  Have you ever experienced hyperglycemia (high blood sugar)?: no    Medication Information  How do you obtain your medications?: Patient drives  Do you use a pill box or medication chart to help you manage your medications?: Pill box  Do you sometimes have difficulty refilling your medications?: No  Medication adherence impacting ability to self-manage diabetes?: Yes (stopped taking Metformin completely due to N/V; now slowly starting XR at b'fast only)    Labs  Do you have regular lab work to monitor your medications?: Yes  Type of Regular Lab Work: A1c, Cholesterol, CBC, Other  Where do you get your labs drawn?: [facility]  Lab Compliance Barriers: No    Nutritional Status  Diet: Regular  Meal Plan 24 Hour Recall: Breakfast, Lunch, Dinner, Snack  Meal Plan 24 Hour Recall - Breakfast:  (Fruity Madai(in 12-16 oz tall cup) w/Fairlife milk  ORGreek yogurt with berries; coffee with flavored creamer)  Meal Plan 24 Hour Recall - Lunch:  (salad--spinach, tomaot, cheese, olive salad, corn; water)  Meal Plan 24 Hour Recall -  Dinner:  (beans, rice, Jaun's chicken OR shrimp, rice, broccoli, honey soy sauce)  Meal Plan 24 Hour Recall - Snack:  (sometimes candy HS)  Change in appetite?: Yes (decreased since starting Mounjaro)  Dentation:: Intact  Recent Changes in Weight: No Recent Weight Change  Current nutritional status an area of need that is impacting patient's ability to self-manage diabetes?: No    Additional Social History    Support  Does anyone support you with your diabetes care?: yes  Who supports you?: self, spouse  Who takes you to your medical appointments?: self  Does the current support meet the patient's needs?: Yes  Is Support an area impacting ability to self-manage diabetes?: No    Access to Mass Media & Technology  Does the patient have access to any of the following devices or technologies?: Smart phone  Media or technology needs impacting ability to self-manage diabetes?: No    Cognitive/Behavioral Health  Alert and Oriented: No  Difficulty Thinking: No  Requires Prompting: No  Requires assistance for routine expression?: No  Cognitive or behavioral barriers impacting ability to self-manage diabetes?: No    Culture/Episcopalian  Culture or Scientologist beliefs that may impact ability to access healthcare: No    Communication  Language preference: English  Hearing Problems: No  Vision Problems: No  Communication needs impacting ability to self-manage diabetes?: No    Health Literacy  Preferred Learning Method: Face to Face  How often do you need to have someone help you read instructions, pamphlets, or written material from your doctor or pharmacy?: Never  Health literacy needs impacting ability to self-manage diabetes?: No      Diabetes Self-Management Skills Assessment    Diabetes Disease Process/Treatment Options  Patient/caregiver able to state what happens when someone has diabetes.: yes  Patient/caregiver knows what type of diabetes they have.: yes  Diabetes Type : Type II  Patient/caregiver able to identify at least  three signs and symptoms of diabetes.: yes  Identified signs and symptoms:: increased thirst, frequent urination, frequent infections, fatigue, blurred vision  Patient able to identify at least three risk factors for diabetes.: yes  Identified risk factors:: age over 40, being overweight, family history, reduced activity  Diabetes Disease Process/Treatment Options: Skills Assessment Completed: Yes  Assessment indicates:: Adequate understanding  Area of need?: No    Nutrition/Healthy Eating  Challenges to healthy eating:: portion control  Method of carbohydrate measurement:: no method  Patient can identify foods that impact blood sugar.: yes  Patient-identified foods:: fruit/fruit juice, milk, starchy vegetables (corn, peas, beans), soda, starches (bread, pasta, rice, cereal), sweets, yogurt  Nutrition/Healthy Eating Skills Assessment Completed:: Yes  Assessment indicates:: Instruction Needed  Area of need?: Yes    Physical Activity/Exercise  Patient's daily activity level:: lightly active  Patient formally exercises outside of work.: yes  Exercise Type: weight training, walking (walks baby 1  1/2 mile daily and walks dog; weights with )  Intensity: Low  Frequency: four or more times a week  Duration: 15 min  Patient can identify forms of physical activity.: yes  Stated forms of physical activity:: any movement performed by muscles that uses energy  Patient can identify reasons why exercise/physical activity is important in diabetes management.: yes  Identified reasons:: lowers risk of heart disease and stroke, relieves stress, lowers blood glucose, blood pressure, and cholesterol, tones muscles, strengthens heart, muscles, and bones  Physical Activity/Exercise Skills Assessment Completed: : Yes  Assessment indicates:: Adequate understanding  Area of need?: No    Medications  Patient is able to describe current diabetes management routine.: yes  Diabetes management routine:: oral medications, injectable  medications (.Metformin 500 mg BID--taking XR only at b'fast; Mounjaro 2.5 mg on SUN)  Patient is able to identify current diabetes medications, dosages, and appropriate timing of medications.: yes  Patient understands the purpose of the medications taken for diabetes.: yes  Patient reports problems or concerns with current medication regimen.: yes  Medication regimen problems/concerns:: concerned about side effects (Pt had N/V with regular Metformin BID, so starting slow with XR)  Medication Skills Assessment Completed:: Yes  Assessment indicates:: Instruction Needed  Area of need?: Yes    Home Blood Glucose Monitoring  Patient states that blood sugar is checked at home daily.: no  Reasons for not monitoring:: other (see comments)  Home Blood Glucose Monitoring Skills Assessment Completed: : Yes  Assessment indicates:: Instruction Needed  Area of need?: Yes    Acute Complications  Acute Complications Skills Assessment Completed: : No  Deffered due to:: Time  Area of need?: No    Chronic Complications  Patient can identify major chronic complications of diabetes.: yes  Stated chronic complications:: heart disease/heart attack, kidney disease, neuropathy/nerve damage, retinopathy, stroke  Patient can identify ways to prevent or delay diabetes complications.: yes  Stated ways to prevent complications:: healthy eating and regular activity, controlling blood sugar  Patient is aware that having diabetes increases risk of heart disease?: Yes  Patient is aware that heart disease is the leading cause of death and disability in people with diabetes?: Yes  Patient able to state risk factors for heart disease?: Yes  Patient stated risk factors for heart disease:: High blood pressure, High cholesterol, Diet, Limited activity, Medication non-adherance, Having diabetes  Patient is taking statin?: Yes  Chronic Complications Skills Assessment Completed: : Yes  Assessment indicates:: Adequate understanding  Area of need?:  No    Psychosocial/Coping  Patient can identify ways of coping with chronic disease.: yes  Patient-stated ways of coping with chronic disease:: support from loved ones  Psychosocial/Coping Skills Assessment Completed: : Yes  Assessment indicates:: Adequate understanding  Area of need?: No      Assessment Summary and Plan    Based on today's diabetes care assessment, the following areas of need were identified:          12/20/2023    12:02 AM   Social   Support No   Access to Mass Media/Tech No   Cognitive/Behavioral Health No   Culture/Mosque No   Communication No   Health Literacy No            12/20/2023    12:02 AM   Clinical   Medication Adherence Yes   Lab Compliance No   Nutritional Status No            12/20/2023    12:02 AM   Diabetes Self-Management Skills   Diabetes Disease Process/Treatment Options No--Discussed pathology of Type 2 diabetes, risk factors and treatment options.      Nutrition/Healthy Eating Yes--see Care Plan   Physical Activity/Exercise No--encouraged pt to be consistent with her walking with goal being 5 days weekly for 30 mins each day   Medication Yes--see Care Plan   Home Blood Glucose Monitoring Yes--see Care Plan   Acute Complications No--Reviewed blood glucose goals, prevention, detection, signs and symptoms, and treatment of hypoglycemia and hyperglycemia, and when to contact the clinic.     Chronic Complications No   Psychosocial/Coping No          Today's interventions were provided through individual discussion, instruction, and written materials were provided.      Patient verbalized understanding of instruction and written materials.  Pt was able to return back demonstration of instructions today. Patient understood key points, needs reinforcement and further instruction.     Diabetes Self-Management Care Plan:    Today's Diabetes Self-Management Care Plan was developed with Elvia's input. Elvia has agreed to work toward the following goal(s) to improve his/her overall  "diabetes control.      Care Plan: Diabetes Management   Updates made since 11/20/2023 12:00 AM        Problem: Medications         Goal: Patient Agrees to take Diabetes Medication(s) as prescribed.    Start Date: 12/20/2023   Expected End Date: 3/20/2024   Priority: High   Barriers: No Barriers Identified   Note:    Pt initially started on Metformin BID; pt was taking BID but on empty stomach many times; c/o N/V so stopped taking. Now has Metformin XR which she is taking once daily--instructed pt to take on FULL STOMACH; start with b'fast for 7 days then add second dose at supper.  Pt also taking Mounjaro on SUN; has taken 2 doses; does notice decreased appetite       Task: Reviewed with patient all current diabetes medications and provided basic review of the purpose, dosage, frequency, side effects, and storage of both oral and injectable diabetes medications. Completed 12/20/2023        Task: Instructed patient on how to self-administer  Completed 12/20/2023        Task: Discussed guidelines for preventing, detecting and treating hypoglycemia and hyperglycemia and reviewed the importance of meal and medication timing with diabetes mediations for prevention of hypoglycemia and maximum drug benefit.         Problem: Blood Glucose Self-Monitoring         Goal: Paatient agrees to check BG levels once daily    Start Date: 12/20/2023   Expected End Date: 3/20/2024   Priority: Medium   Barriers: No Barriers Identified   Note:    Pt hasa not been checking BG levels in "quite some time"; about 2 weeks ago UQ=480 at bedtime. Reviewed BG goals; HA1C goals.  Provided BG log and asked pt to test a few times per weeks if she cannot due daily--fasting AM and occasionally evening, making sure to indicate if 2 hours after meal or HS.  Pt states Dr Jones mentioned possibility of CGM--informed pt that MD must place order for that device.       Task: Reviewed the importance of self-monitoring blood glucose and keeping logs. " "Completed 12/20/2023        Task: Provided patient with blood glucose logs, reviewed appropriate timing and frequency to SMBG, education on parameters on when to notify provider and advised patient to bring logs to all appts with PCP/Endocrinologist/Diabetes Care Specialist. Completed 12/20/2023        Problem: Healthy Eating         Goal: Eat 3 meals daily with 30 g/2 servings of Carbohydrate per meal.    Start Date: 12/20/2023   Expected End Date: 3/20/2024   Priority: Level 2   Barriers: No Barriers Identified   Note:    Reviewed all aspects of a healthy diet with emphasis on consistent amount of carbs and combining carbs w/PRO.  Pt has tried other diets and has also been trying to reduce/eliminate carbs.  Pt states she does not like sugar subs or those kind of beverages (drinks water or crystal light); does not like salad/lettuces but will eat spinach and other non-starchy veggies. Loves bread but has been trying to stay away from it.   cooks frequently; but also will snack on candy in bed at night and pt will (sometimes) have some.  Pt acknowledges decreased appetite since taking Mounjaro (2 doses so far); states she "eats less than her 8 year old". Emphasized importance of reading labels correctly to determine appropriate portions. Discussed specfic examples of carb and PRO combinations (PRO drink; cheese; PNB)    NOTE:  pt cares for her godchild (starts at 4 AM) and states she eats healthier when not with child.  Discussed ways to eat better/healthier        Task: Reviewed the sources and role of Carbohydrate, Protein, and Fat and how each nutrient impacts blood sugar. Completed 12/20/2023        Task: Provided visual examples using dry measuring cups, food models, and other familiar objects such as computer mouse, deck or cards, tennis ball etc. to help with visualization of portions. Completed 12/20/2023        Task: Explained how to count carbohydrates using the food label and the use of dry " measuring cups for accurate carb counting. Completed 12/20/2023        Task: Discussed strategies for choosing healthier menu options when dining out. Completed 12/20/2023        Task: Recommended replacing beverages containing high sugar content with noncaloric/sugar free options and/or water. Completed 12/20/2023        Task: Review the importance of balancing carbohydrates with each meal using portion control techniques to count servings of carbohydrate and label reading to identify serving size and amount of total carbs per serving. Completed 12/20/2023        Task: Provided Sample plate method and reviewed the use of the plate to estimate amounts of carbohydrate per meal. Completed 12/20/2023          Follow Up Plan     Follow up in about 2 months (around 2/20/2024) for f/u on 2/7/24 to discuss diet changes; exercise (cardio) and review BG log.    Today's care plan and follow up schedule was discussed with patient.  Elvia verbalized understanding of the care plan, goals, and agrees to follow up plan.        The patient was encouraged to communicate with his/her health care provider/physician and care team regarding his/her condition(s) and treatment.  I provided the patient with my contact information today and encouraged to contact me via phone or [facility] as needed.     Length of Visit   Total Time: 60 Minutes

## 2023-12-20 NOTE — PROGRESS NOTES
Gardasil administered IM with no adverse reactions, advised patient to wait in lobby for 15 minutes.

## 2023-12-27 ENCOUNTER — PATIENT MESSAGE (OUTPATIENT)
Dept: ADMINISTRATIVE | Facility: HOSPITAL | Age: 43
End: 2023-12-27
Payer: COMMERCIAL

## 2024-01-04 DIAGNOSIS — E66.9 OBESITY (BMI 35.0-39.9 WITHOUT COMORBIDITY): ICD-10-CM

## 2024-01-04 DIAGNOSIS — E11.9 TYPE 2 DIABETES MELLITUS WITHOUT COMPLICATION, WITHOUT LONG-TERM CURRENT USE OF INSULIN: ICD-10-CM

## 2024-01-04 RX ORDER — TIRZEPATIDE 2.5 MG/.5ML
2.5 INJECTION, SOLUTION SUBCUTANEOUS
Qty: 4 PEN | Refills: 0 | Status: CANCELLED | OUTPATIENT
Start: 2024-01-04 | End: 2024-02-03

## 2024-01-05 DIAGNOSIS — E66.9 OBESITY (BMI 35.0-39.9 WITHOUT COMORBIDITY): ICD-10-CM

## 2024-01-05 DIAGNOSIS — E11.9 TYPE 2 DIABETES MELLITUS WITHOUT COMPLICATION, WITHOUT LONG-TERM CURRENT USE OF INSULIN: ICD-10-CM

## 2024-01-08 DIAGNOSIS — E11.9 TYPE 2 DIABETES MELLITUS WITHOUT COMPLICATION, WITHOUT LONG-TERM CURRENT USE OF INSULIN: ICD-10-CM

## 2024-01-08 DIAGNOSIS — E11.69 HYPERLIPIDEMIA ASSOCIATED WITH TYPE 2 DIABETES MELLITUS: ICD-10-CM

## 2024-01-08 DIAGNOSIS — E78.5 HYPERLIPIDEMIA ASSOCIATED WITH TYPE 2 DIABETES MELLITUS: ICD-10-CM

## 2024-01-08 RX ORDER — TIRZEPATIDE 2.5 MG/.5ML
2.5 INJECTION, SOLUTION SUBCUTANEOUS
Qty: 4 PEN | Refills: 0 | Status: SHIPPED | OUTPATIENT
Start: 2024-01-08 | End: 2024-02-07

## 2024-01-09 RX ORDER — DEXTROSE 4 G
1 TABLET,CHEWABLE ORAL 2 TIMES DAILY
Qty: 1 EACH | Refills: 0 | Status: SHIPPED | OUTPATIENT
Start: 2024-01-09 | End: 2024-07-07

## 2024-01-09 NOTE — TELEPHONE ENCOUNTER
Refill Routing Note   Medication(s) are not appropriate for processing by [facility] for the following reason(s):        Non-participating provider    [facility] action(s):  Route               Appointments  past 12m or future 3m with PCP    Date Provider   Last Visit   12/8/2023 Paul Jones, DO   Next Visit   3/12/2024 Paul Jones, DO   ED visits in past 90 days: 0        Note composed:2:33 PM 01/09/2024

## 2024-01-31 DIAGNOSIS — E66.9 OBESITY (BMI 35.0-39.9 WITHOUT COMORBIDITY): ICD-10-CM

## 2024-01-31 DIAGNOSIS — E11.9 TYPE 2 DIABETES MELLITUS WITHOUT COMPLICATION, WITHOUT LONG-TERM CURRENT USE OF INSULIN: ICD-10-CM

## 2024-02-05 ENCOUNTER — PATIENT MESSAGE (OUTPATIENT)
Dept: FAMILY MEDICINE | Facility: CLINIC | Age: 44
End: 2024-02-05
Payer: COMMERCIAL

## 2024-02-05 RX ORDER — TIRZEPATIDE 5 MG/.5ML
5 INJECTION, SOLUTION SUBCUTANEOUS
Qty: 4 PEN | Refills: 0 | Status: SHIPPED | OUTPATIENT
Start: 2024-02-05 | End: 2024-03-06

## 2024-02-05 NOTE — TELEPHONE ENCOUNTER
Pt requesting refill and increase of tirzepatide rx; last office visit was 12/8/23. PCP to authorize pt notified of status.

## 2024-02-08 NOTE — TELEPHONE ENCOUNTER
Please inform patient that I have refilled the 5 mg and we will stay at that dose until I have repeat labs to check her liver function on this medication.  I will also need to have her an office visit to check her weight and assess the effectiveness of the medication.

## 2024-02-09 NOTE — TELEPHONE ENCOUNTER
Pt notified of PCP recommendations voiced understanding follow up appt scheduled for 3/12/24 @10:30am.

## 2024-02-14 DIAGNOSIS — E11.9 TYPE 2 DIABETES MELLITUS WITHOUT COMPLICATION, UNSPECIFIED WHETHER LONG TERM INSULIN USE: ICD-10-CM

## 2024-02-21 LAB
LEFT EYE DM RETINOPATHY: NEGATIVE
RIGHT EYE DM RETINOPATHY: NEGATIVE

## 2024-02-26 ENCOUNTER — PATIENT OUTREACH (OUTPATIENT)
Dept: ADMINISTRATIVE | Facility: HOSPITAL | Age: 44
End: 2024-02-26
Payer: COMMERCIAL

## 2024-02-26 NOTE — PROGRESS NOTES
Population Health Chart Review & Patient Outreach Details    Outreach Performed: NO    Additional Pop Health Notes:           Updates Requested / Reviewed:      Updated Care Coordination Note, , and Care Team Updated         Health Maintenance Topics Overdue:    Health Maintenance Due   Topic Date Due    Diabetes Urine Screening  Never done    Pneumococcal Vaccines (Age 0-64) (1 of 2 - PCV) Never done    Foot Exam  Never done    Eye Exam  Never done    Influenza Vaccine (1) Never done    COVID-19 Vaccine (3 - 2023-24 season) 09/01/2023         Health Maintenance Topic(s) Outreach Outcomes & Actions Taken:    Eye Exam - Outreach Outcomes & Actions Taken  : Diabetic Eye External Records Uploaded, Care Team & History Updated if Applicable

## 2024-02-27 ENCOUNTER — E-VISIT (OUTPATIENT)
Dept: FAMILY MEDICINE | Facility: CLINIC | Age: 44
End: 2024-02-27
Payer: COMMERCIAL

## 2024-02-27 DIAGNOSIS — J06.9 UPPER RESPIRATORY TRACT INFECTION, UNSPECIFIED TYPE: Primary | ICD-10-CM

## 2024-02-27 PROCEDURE — 99212 OFFICE O/P EST SF 10 MIN: CPT | Mod: 95,,, | Performed by: STUDENT IN AN ORGANIZED HEALTH CARE EDUCATION/TRAINING PROGRAM

## 2024-02-27 RX ORDER — MOXIFLOXACIN HYDROCHLORIDE 400 MG/1
400 TABLET ORAL DAILY
Qty: 7 TABLET | Refills: 0 | Status: SHIPPED | OUTPATIENT
Start: 2024-02-27 | End: 2024-03-06

## 2024-02-27 NOTE — PROGRESS NOTES
Dear Elvia,     Thank you for reaching out to me for an e-visit so we can address your concern regarding: URI (Entered automatically based on patient selection in Patient Portal.)     I have reviewed your chart and read the answers to the questionnaire that you completed.  Based on the information provided, my diagnosis and recommendations are as follows:    Visit Diagnosis    1. Upper respiratory tract infection, unspecified type      Medications Ordered                [facility] [address], [facility] [address]    Telephone: [phone]   Fax: [phone]   Hours: Mon-Fri, 8a-5:30p                         Internal Pharmacy (1 of 1)              moxifloxacin (AVELOX) 400 mg tablet    Sig: Take 1 tablet (400 mg total) by mouth once daily. for 7 days       Start: 2/27/24     Quantity: 7 tablet Refills: 0                            Please let me know if there is something else that you need.  If you send additional messages concerning this illness, please use this message thread to communicate.      Paul Jones

## 2024-03-07 ENCOUNTER — PATIENT MESSAGE (OUTPATIENT)
Dept: FAMILY MEDICINE | Facility: CLINIC | Age: 44
End: 2024-03-07
Payer: COMMERCIAL

## 2024-03-08 ENCOUNTER — TELEPHONE (OUTPATIENT)
Dept: FAMILY MEDICINE | Facility: CLINIC | Age: 44
End: 2024-03-08
Payer: COMMERCIAL

## 2024-03-08 DIAGNOSIS — E11.9 TYPE 2 DIABETES MELLITUS WITHOUT COMPLICATION, WITHOUT LONG-TERM CURRENT USE OF INSULIN: Primary | ICD-10-CM

## 2024-03-08 NOTE — TELEPHONE ENCOUNTER
----- Message from Zaria Koo, Patient Care Assistant sent at 3/8/2024 11:42 AM CST -----  Regarding: orders  Contact: pt  Type: Needs Medical Advice    Who Called:  pt     Best Call Back Number: [phone] (home)     Additional Information: pt states she would like a callback regarding lab orders. Thanks!

## 2024-03-08 NOTE — TELEPHONE ENCOUNTER
Please advise the patient that I needed her to have this lab work before the next appointment, so that we can discuss these labs at this appointment..  And there is no 0.75 dosing on this medication.  This was also stated in a  02/05/2024 message

## 2024-03-12 ENCOUNTER — LAB VISIT (OUTPATIENT)
Dept: LAB | Facility: HOSPITAL | Age: 44
End: 2024-03-12
Attending: STUDENT IN AN ORGANIZED HEALTH CARE EDUCATION/TRAINING PROGRAM
Payer: COMMERCIAL

## 2024-03-12 ENCOUNTER — OFFICE VISIT (OUTPATIENT)
Dept: FAMILY MEDICINE | Facility: CLINIC | Age: 44
End: 2024-03-12
Payer: COMMERCIAL

## 2024-03-12 VITALS
HEIGHT: 69 IN | HEART RATE: 90 BPM | OXYGEN SATURATION: 98 % | TEMPERATURE: 99 F | DIASTOLIC BLOOD PRESSURE: 80 MMHG | WEIGHT: 256.19 LBS | SYSTOLIC BLOOD PRESSURE: 120 MMHG | BODY MASS INDEX: 37.95 KG/M2

## 2024-03-12 DIAGNOSIS — E11.69 HYPERLIPIDEMIA ASSOCIATED WITH TYPE 2 DIABETES MELLITUS: ICD-10-CM

## 2024-03-12 DIAGNOSIS — E66.01 SEVERE OBESITY (BMI 35.0-39.9) WITH COMORBIDITY: ICD-10-CM

## 2024-03-12 DIAGNOSIS — K59.00 CONSTIPATION, UNSPECIFIED CONSTIPATION TYPE: ICD-10-CM

## 2024-03-12 DIAGNOSIS — E78.5 HYPERLIPIDEMIA ASSOCIATED WITH TYPE 2 DIABETES MELLITUS: ICD-10-CM

## 2024-03-12 DIAGNOSIS — E11.9 TYPE 2 DIABETES MELLITUS WITHOUT COMPLICATION, WITHOUT LONG-TERM CURRENT USE OF INSULIN: ICD-10-CM

## 2024-03-12 DIAGNOSIS — Z09 FOLLOW-UP EXAM: Primary | ICD-10-CM

## 2024-03-12 LAB
ALBUMIN SERPL BCP-MCNC: 3.8 G/DL (ref 3.5–5.2)
ALP SERPL-CCNC: 53 U/L (ref 55–135)
ALT SERPL W/O P-5'-P-CCNC: 19 U/L (ref 10–44)
ANION GAP SERPL CALC-SCNC: 8 MMOL/L (ref 8–16)
AST SERPL-CCNC: 17 U/L (ref 10–40)
BILIRUB SERPL-MCNC: 0.4 MG/DL (ref 0.1–1)
BUN SERPL-MCNC: 8 MG/DL (ref 6–20)
CALCIUM SERPL-MCNC: 9.7 MG/DL (ref 8.7–10.5)
CHLORIDE SERPL-SCNC: 105 MMOL/L (ref 95–110)
CHOLEST SERPL-MCNC: 164 MG/DL (ref 120–199)
CHOLEST/HDLC SERPL: 4.1 {RATIO} (ref 2–5)
CO2 SERPL-SCNC: 24 MMOL/L (ref 23–29)
CREAT SERPL-MCNC: 0.7 MG/DL (ref 0.5–1.4)
ERYTHROCYTE [DISTWIDTH] IN BLOOD BY AUTOMATED COUNT: 12.5 % (ref 11.5–14.5)
EST. GFR  (NO RACE VARIABLE): >60 ML/MIN/1.73 M^2
ESTIMATED AVG GLUCOSE: 126 MG/DL (ref 68–131)
GLUCOSE SERPL-MCNC: 120 MG/DL (ref 70–110)
HBA1C MFR BLD: 6 % (ref 4–5.6)
HCT VFR BLD AUTO: 44.9 % (ref 37–48.5)
HDLC SERPL-MCNC: 40 MG/DL (ref 40–75)
HDLC SERPL: 24.4 % (ref 20–50)
HGB BLD-MCNC: 14.6 G/DL (ref 12–16)
LDLC SERPL CALC-MCNC: 98.8 MG/DL (ref 63–159)
MCH RBC QN AUTO: 28.5 PG (ref 27–31)
MCHC RBC AUTO-ENTMCNC: 32.5 G/DL (ref 32–36)
MCV RBC AUTO: 88 FL (ref 82–98)
NONHDLC SERPL-MCNC: 124 MG/DL
PLATELET # BLD AUTO: 371 K/UL (ref 150–450)
PMV BLD AUTO: 9.3 FL (ref 9.2–12.9)
POTASSIUM SERPL-SCNC: 4.4 MMOL/L (ref 3.5–5.1)
PROT SERPL-MCNC: 8 G/DL (ref 6–8.4)
RBC # BLD AUTO: 5.12 M/UL (ref 4–5.4)
SODIUM SERPL-SCNC: 137 MMOL/L (ref 136–145)
TRIGL SERPL-MCNC: 126 MG/DL (ref 30–150)
WBC # BLD AUTO: 8.98 K/UL (ref 3.9–12.7)

## 2024-03-12 PROCEDURE — 36415 COLL VENOUS BLD VENIPUNCTURE: CPT | Mod: PO | Performed by: STUDENT IN AN ORGANIZED HEALTH CARE EDUCATION/TRAINING PROGRAM

## 2024-03-12 PROCEDURE — 85027 COMPLETE CBC AUTOMATED: CPT | Performed by: STUDENT IN AN ORGANIZED HEALTH CARE EDUCATION/TRAINING PROGRAM

## 2024-03-12 PROCEDURE — 83036 HEMOGLOBIN GLYCOSYLATED A1C: CPT | Performed by: STUDENT IN AN ORGANIZED HEALTH CARE EDUCATION/TRAINING PROGRAM

## 2024-03-12 PROCEDURE — 1159F MED LIST DOCD IN RCRD: CPT | Mod: CPTII,S$GLB,, | Performed by: STUDENT IN AN ORGANIZED HEALTH CARE EDUCATION/TRAINING PROGRAM

## 2024-03-12 PROCEDURE — 2023F DILAT RTA XM W/O RTNOPTHY: CPT | Mod: CPTII,S$GLB,, | Performed by: STUDENT IN AN ORGANIZED HEALTH CARE EDUCATION/TRAINING PROGRAM

## 2024-03-12 PROCEDURE — 3074F SYST BP LT 130 MM HG: CPT | Mod: CPTII,S$GLB,, | Performed by: STUDENT IN AN ORGANIZED HEALTH CARE EDUCATION/TRAINING PROGRAM

## 2024-03-12 PROCEDURE — 3008F BODY MASS INDEX DOCD: CPT | Mod: CPTII,S$GLB,, | Performed by: STUDENT IN AN ORGANIZED HEALTH CARE EDUCATION/TRAINING PROGRAM

## 2024-03-12 PROCEDURE — 3044F HG A1C LEVEL LT 7.0%: CPT | Mod: CPTII,S$GLB,, | Performed by: STUDENT IN AN ORGANIZED HEALTH CARE EDUCATION/TRAINING PROGRAM

## 2024-03-12 PROCEDURE — 99999 PR PBB SHADOW E&M-EST. PATIENT-LVL V: CPT | Mod: PBBFAC,,, | Performed by: STUDENT IN AN ORGANIZED HEALTH CARE EDUCATION/TRAINING PROGRAM

## 2024-03-12 PROCEDURE — 1160F RVW MEDS BY RX/DR IN RCRD: CPT | Mod: CPTII,S$GLB,, | Performed by: STUDENT IN AN ORGANIZED HEALTH CARE EDUCATION/TRAINING PROGRAM

## 2024-03-12 PROCEDURE — 99214 OFFICE O/P EST MOD 30 MIN: CPT | Mod: S$GLB,,, | Performed by: STUDENT IN AN ORGANIZED HEALTH CARE EDUCATION/TRAINING PROGRAM

## 2024-03-12 PROCEDURE — 3079F DIAST BP 80-89 MM HG: CPT | Mod: CPTII,S$GLB,, | Performed by: STUDENT IN AN ORGANIZED HEALTH CARE EDUCATION/TRAINING PROGRAM

## 2024-03-12 PROCEDURE — 80053 COMPREHEN METABOLIC PANEL: CPT | Performed by: STUDENT IN AN ORGANIZED HEALTH CARE EDUCATION/TRAINING PROGRAM

## 2024-03-12 PROCEDURE — 80061 LIPID PANEL: CPT | Performed by: STUDENT IN AN ORGANIZED HEALTH CARE EDUCATION/TRAINING PROGRAM

## 2024-03-12 PROCEDURE — 82043 UR ALBUMIN QUANTITATIVE: CPT | Performed by: STUDENT IN AN ORGANIZED HEALTH CARE EDUCATION/TRAINING PROGRAM

## 2024-03-12 RX ORDER — BLOOD-GLUCOSE SENSOR
3 EACH MISCELLANEOUS
Qty: 3 EACH | Refills: 11 | Status: SHIPPED | OUTPATIENT
Start: 2024-03-12 | End: 2025-03-12

## 2024-03-12 RX ORDER — BLOOD-GLUCOSE,RECEIVER,CONT
1 EACH MISCELLANEOUS CONTINUOUS
Qty: 1 EACH | Refills: 0 | Status: SHIPPED | OUTPATIENT
Start: 2024-03-12 | End: 2025-03-12

## 2024-03-12 RX ORDER — ISOPROPYL ALCOHOL 70 ML/100ML
1 SWAB TOPICAL 3 TIMES DAILY
Qty: 300 EACH | Refills: 3 | Status: SHIPPED | OUTPATIENT
Start: 2024-03-12 | End: 2025-03-12

## 2024-03-12 RX ORDER — POLYETHYLENE GLYCOL 3350 17 G/17G
17 POWDER, FOR SOLUTION ORAL 2 TIMES DAILY PRN
Qty: 510 G | Refills: 0 | Status: SHIPPED | OUTPATIENT
Start: 2024-03-12 | End: 2024-09-08

## 2024-03-12 RX ORDER — METFORMIN HYDROCHLORIDE 750 MG/1
750 TABLET, EXTENDED RELEASE ORAL
Qty: 90 TABLET | Refills: 1 | Status: SHIPPED | OUTPATIENT
Start: 2024-03-12 | End: 2024-09-08

## 2024-03-12 RX ORDER — BLOOD-GLUCOSE TRANSMITTER
1 EACH MISCELLANEOUS CONTINUOUS
Qty: 1 EACH | Refills: 3 | Status: SHIPPED | OUTPATIENT
Start: 2024-03-12 | End: 2025-03-12

## 2024-03-13 LAB
ALBUMIN/CREAT UR: 6.3 UG/MG (ref 0–30)
CREAT UR-MCNC: 189 MG/DL (ref 15–325)
MICROALBUMIN UR DL<=1MG/L-MCNC: 12 UG/ML

## 2024-03-14 ENCOUNTER — PATIENT MESSAGE (OUTPATIENT)
Dept: FAMILY MEDICINE | Facility: CLINIC | Age: 44
End: 2024-03-14
Payer: COMMERCIAL

## 2024-03-15 ENCOUNTER — PATIENT OUTREACH (OUTPATIENT)
Dept: DIABETES | Facility: CLINIC | Age: 44
End: 2024-03-15
Payer: COMMERCIAL

## 2024-03-15 NOTE — PATIENT INSTRUCTIONS
Pt missed f/u appt for DM Education on 2/21/24 and 2/29/24.  Called today to see if pt would like to reschedule.  Pt has a respiratory infection but is interested and did reschedule for 3/2/24 at 8 AM

## 2024-04-01 ENCOUNTER — E-VISIT (OUTPATIENT)
Dept: FAMILY MEDICINE | Facility: CLINIC | Age: 44
End: 2024-04-01
Payer: COMMERCIAL

## 2024-04-01 ENCOUNTER — TELEPHONE (OUTPATIENT)
Dept: FAMILY MEDICINE | Facility: CLINIC | Age: 44
End: 2024-04-01

## 2024-04-01 DIAGNOSIS — J02.9 PHARYNGITIS, UNSPECIFIED ETIOLOGY: Primary | ICD-10-CM

## 2024-04-01 PROCEDURE — 99212 OFFICE O/P EST SF 10 MIN: CPT | Mod: 95,,, | Performed by: STUDENT IN AN ORGANIZED HEALTH CARE EDUCATION/TRAINING PROGRAM

## 2024-04-01 RX ORDER — FLUCONAZOLE 150 MG/1
150 TABLET ORAL DAILY
Qty: 1 TABLET | Refills: 0 | Status: SHIPPED | OUTPATIENT
Start: 2024-04-01 | End: 2024-04-02

## 2024-04-01 RX ORDER — AMOXICILLIN 875 MG/1
875 TABLET, FILM COATED ORAL EVERY 12 HOURS
Qty: 14 TABLET | Refills: 0 | Status: SHIPPED | OUTPATIENT
Start: 2024-04-01 | End: 2024-04-08

## 2024-04-01 RX ORDER — BENZONATATE 200 MG/1
200 CAPSULE ORAL 3 TIMES DAILY PRN
Qty: 30 CAPSULE | Refills: 0 | Status: SHIPPED | OUTPATIENT
Start: 2024-04-01 | End: 2024-04-11

## 2024-04-01 NOTE — TELEPHONE ENCOUNTER
----- Message from Garth Ge sent at 4/1/2024  9:47 AM CDT -----  Contact: Self  Type: Needs Medical Advice  Who Called:  PT  Symptoms (please be specific):  Sore throat    Best Call Back Number: [phone]    Additional Information:

## 2024-04-01 NOTE — PROGRESS NOTES
Dear Elvia,     Thank you for reaching out to me for an e-visit so we can address your concern regarding: URI (Entered automatically based on patient selection in Patient Portal.)     I have reviewed your chart and read the answers to the questionnaire that you completed.  Based on the information provided, my diagnosis and recommendations are as follows:    Visit Diagnosis    1. Pharyngitis, unspecified etiology    I have sent the medications below to the pharmacy.  Medications Ordered                [id number] - [address] & [address] [address]    Telephone: [phone]   Fax: [phone]   Hours: Open 24 hours                         E-Prescribed (3 of 3)              amoxicillin (AMOXIL) 875 MG tablet    Sig: Take 1 tablet (875 mg total) by mouth every 12 (twelve) hours. for 7 days       Start: 4/1/24     Quantity: 14 tablet Refills: 0                         benzonatate (TESSALON) 200 MG capsule    Sig: Take 1 capsule (200 mg total) by mouth 3 (three) times daily as needed.       Start: 4/1/24     Quantity: 30 capsule Refills: 0                         fluconazole (DIFLUCAN) 150 MG Tab    Sig: Take 1 tablet (150 mg total) by mouth once daily. for 1 day       Start: 4/1/24     Quantity: 1 tablet Refills: 0                            Please let me know if there is something else that you need.  If you send additional messages concerning this illness, please use this message thread to communicate.      Paul Jones, DO

## 2024-04-07 ENCOUNTER — ON-DEMAND VIRTUAL (OUTPATIENT)
Dept: URGENT CARE | Facility: CLINIC | Age: 44
End: 2024-04-07
Payer: COMMERCIAL

## 2024-04-07 DIAGNOSIS — J02.9 ACUTE PHARYNGITIS, UNSPECIFIED ETIOLOGY: Primary | ICD-10-CM

## 2024-04-07 PROCEDURE — 99213 OFFICE O/P EST LOW 20 MIN: CPT | Mod: 95,,, | Performed by: NURSE PRACTITIONER

## 2024-04-07 RX ORDER — PREDNISONE 20 MG/1
20 TABLET ORAL DAILY
Qty: 3 TABLET | Refills: 0 | Status: SHIPPED | OUTPATIENT
Start: 2024-04-07 | End: 2024-04-10

## 2024-04-07 NOTE — PROGRESS NOTES
Subjective:      Patient ID: Elvia Cheney is a 43 y.o. female.    Vitals:  vitals were not taken for this visit.     Chief Complaint: Sore Throat      Visit Type: TELE AUDIOVISUAL    Present with the patient at the time of consultation: TELEMED PRESENT WITH PATIENT: None        Past Medical History:   Diagnosis Date    Diabetes mellitus, type 2     Hyperlipidemia      History reviewed. No pertinent surgical history.  Review of patient's allergies indicates:  No Known Allergies  Current Outpatient Medications on File Prior to Visit   Medication Sig Dispense Refill    albuterol (PROVENTIL/VENTOLIN HFA) 90 mcg/actuation inhaler Inhale 1 puff into the lungs every 4 (four) hours as needed for Shortness of Breath. 18 g 3    alcohol swabs PadM Apply 1 each topically 3 (three) times daily. 300 each 3    amoxicillin (AMOXIL) 875 MG tablet Take 1 tablet (875 mg total) by mouth every 12 (twelve) hours. for 7 days 14 tablet 0    atorvastatin (LIPITOR) 20 MG tablet Take 1 tablet (20 mg total) by mouth once daily. 90 tablet 3    benzonatate (TESSALON) 200 MG capsule Take 1 capsule (200 mg total) by mouth 3 (three) times daily as needed. 30 capsule 0    blood sugar diagnostic (CONTOUR NEXT TEST STRIPS) Strp 1 strip by Misc.(Non-Drug; Combo Route) route 3 (three) times daily. 300 strip 6    blood sugar diagnostic Strp 1 strip by Misc.(Non-Drug; Combo Route) route 2 (two) times a day. Use with meter to check blood glucose. 200 strip 2    blood-glucose meter,continuous (DEXCOM G6 ) Misc 1 Units by Misc.(Non-Drug; Combo Route) route continuous. 1 each 0    blood-glucose sensor (DEXCOM G6 SENSOR) Deepa 3 Devices by Misc.(Non-Drug; Combo Route) route every 10 days. 3 each 11    blood-glucose transmitter (DEXCOM G6 TRANSMITTER) Deepa 1 Device by Misc.(Non-Drug; Combo Route) route continuous. 1 each 3    cetirizine (ZYRTEC) 10 MG tablet Take 10 mg by mouth.      fluticasone propionate (FLONASE) 50 mcg/actuation nasal spray 1 spray  (50 mcg total) by Each Nostril route once daily. 16 g 3    lancets (TRUEPLUS LANCETS) 33 gauge Misc as directed 200 each 5    metFORMIN (GLUCOPHAGE-XR) 750 MG ER 24hr tablet Take 1 tablet (750 mg total) by mouth daily with breakfast. 90 tablet 1    norethindrone-ethinyl estradiol (BLISOVI FE 1/20, 28,) 1 mg-20 mcg (21)/75 mg (7) per tablet Take 1 tablet by mouth once daily. 90 tablet 3    polyethylene glycol (GLYCOLAX) 17 gram/dose powder Take 17 g by mouth 2 (two) times daily as needed for Constipation. For Constipation 510 g 0    tirzepatide 7.5 mg/0.5 mL PnIj Inject 7.5 mg into the skin every 7 days. For type 2 diabetes 8 Pen 1     No current facility-administered medications on file prior to visit.     Family History   Problem Relation Age of Onset    Diabetes Maternal Grandmother     Hypertension Maternal Grandmother     Diabetes Paternal Grandfather     Hypertension Paternal Grandfather     Hyperthyroidism Maternal Aunt        Medications Ordered                [id number] [address] & [address] [address]    Telephone: [phone]   Fax: [phone]   Hours: Open 24 hours                         E-Prescribed (2 of 2)              (Magic mouthwash) 1:1:1 diphenhydrAMINE(Benadryl) 12.5mg/5ml liq, aluminum & magnesium hydroxide-simethicone (Maalox), LIDOcaine viscous 2%    Sig: Swish and spit 5 mLs every 4 (four) hours as needed (sore throat). for mouth sores       Start: 4/7/24     Quantity: 118 mL Refills: 0                         predniSONE (DELTASONE) 20 MG tablet    Sig: Take 1 tablet (20 mg total) by mouth once daily. for 3 days       Start: 4/7/24     Quantity: 3 tablet Refills: 0                           Ohs Peq Odvv Intake    4/7/2024  5:53 PM CDT - Filed by Patient   What is your current physical address in the event of a medical emergency? [address]   Are you able to take your vital signs? No   Please attach any  relevant images or files          44 yo female with c/o sore throat for the last 7 days. She has taken amoxil prescribed by her primary. She is using albuterol and flonase. She states fever several days ago. She states flonase helps slightly. She states some relief with inhaler. She is diabetic and asthmatic. Patient states similar several weeks ago and did avelox         Constitution: Negative. Negative for fever and generalized weakness.   HENT:  Positive for sore throat. Negative for congestion and postnasal drip.    Cardiovascular: Negative.  Negative for sob on exertion.   Respiratory:  Negative for cough, sputum production, shortness of breath and wheezing.    Gastrointestinal: Negative.    Endocrine: negative.   Genitourinary: Negative.  Negative for frequency and urgency.   Musculoskeletal: Negative.    Skin: Negative.    Allergic/Immunologic: Negative.  Negative for sneezing.   Neurological: Negative.  Negative for headaches.   Hematologic/Lymphatic: Negative.    Psychiatric/Behavioral: Negative.          Objective:   The physical exam was conducted virtually.  LOCATION OF PATIENT home  Physical Exam   Constitutional: She is oriented to person, place, and time. She appears well-developed.   HENT:   Head: Normocephalic and atraumatic.   Ears:   Right Ear: Hearing, tympanic membrane and external ear normal.   Left Ear: Hearing, tympanic membrane and external ear normal.   Nose: Nose normal.   Mouth/Throat: Uvula is midline, oropharynx is clear and moist and mucous membranes are normal.   Eyes: Conjunctivae and EOM are normal. Pupils are equal, round, and reactive to light.   Neck: Neck supple.   Cardiovascular: Normal rate.   Pulmonary/Chest: Effort normal and breath sounds normal.   Musculoskeletal: Normal range of motion.         General: Normal range of motion.   Neurological: She is alert and oriented to person, place, and time.   Skin: Skin is warm.   Psychiatric: Her behavior is normal. Thought content  normal.   Nursing note and vitals reviewed.      Assessment:     1. Acute pharyngitis, unspecified etiology        Plan:     Discussed with patient in person follow up    PLEASE READ YOUR DISCHARGE INSTRUCTIONS ENTIRELY AS IT CONTAINS IMPORTANT INFORMATION.    Take the antibiotics to completion.    Sore throat recommendations: Warm fluids, warm salt water gargles, throat lozenges, tea, honey, soup, rest, hydration.      Tylenol and ibuprofen    Swish and spit out the magic mouthwash, use just as needed for sore throat.    Please return or see your primary care doctor if you develop new or worsening symptoms.     Please arrange follow up with your primary medical clinic as soon as possible. You must understand that you've received an Urgent Care treatment only and that you may be released before all of your medical problems are known or treated. You, the patient, will arrange for follow up as instructed. If your symptoms worsen or fail to improve you should go to the Emergency Room.    Acute pharyngitis, unspecified etiology  -     (Magic mouthwash) 1:1:1 diphenhydrAMINE(Benadryl) 12.5mg/5ml liq, aluminum & magnesium hydroxide-simethicone (Maalox), LIDOcaine viscous 2%; Swish and spit 5 mLs every 4 (four) hours as needed (sore throat). for mouth sores  Dispense: 118 mL; Refill: 0  -     predniSONE (DELTASONE) 20 MG tablet; Take 1 tablet (20 mg total) by mouth once daily. for 3 days  Dispense: 3 tablet; Refill: 0

## 2024-04-08 ENCOUNTER — TELEPHONE (OUTPATIENT)
Dept: FAMILY MEDICINE | Facility: CLINIC | Age: 44
End: 2024-04-08
Payer: COMMERCIAL

## 2024-04-08 NOTE — TELEPHONE ENCOUNTER
"Spoke to pt who states she is currently suffering with sore throat, coughing and has completed her antibiotics. Pt denies any mucous with cough. States she is blowing very little green from nose. Pt states the sore throat is still "full on effect". Pt denies any improvement with symptoms and states she was advised to call back Monday if still not better.    LOV 3/12/24 and E-visit 4/1/24  Please advise how you would like to proceed  "

## 2024-04-08 NOTE — TELEPHONE ENCOUNTER
----- Message from Franck Underwood sent at 4/8/2024  7:58 AM CDT -----  Regarding: Condition did not improve  Type:  Needs Medical Advice    Who Called: Pt    Symptoms (please be specific): Still having same symptoms     How long has patient had these symptoms:      Pharmacy name and phone #:        [facility]  [address]  [facility] [address]  Phone: [phone] Fax: [phone]        Would the patient rather a call back or a response via [facility]? Call back    Best Call Back Number: [phone]      Additional Information: Sts she was told to let the office know once she finished the antibiotics if her symptoms did not improve.  She sts they did not work and she still isn't feeling well.   Please advise -- Thank you

## 2024-04-11 ENCOUNTER — PATIENT MESSAGE (OUTPATIENT)
Dept: FAMILY MEDICINE | Facility: CLINIC | Age: 44
End: 2024-04-11
Payer: COMMERCIAL

## 2024-04-12 ENCOUNTER — TELEPHONE (OUTPATIENT)
Dept: FAMILY MEDICINE | Facility: CLINIC | Age: 44
End: 2024-04-12
Payer: COMMERCIAL

## 2024-04-12 DIAGNOSIS — J02.9 PHARYNGITIS, UNSPECIFIED ETIOLOGY: Primary | ICD-10-CM

## 2024-04-12 RX ORDER — AMOXICILLIN AND CLAVULANATE POTASSIUM 875; 125 MG/1; MG/1
1 TABLET, FILM COATED ORAL 2 TIMES DAILY
Qty: 20 TABLET | Refills: 0 | Status: SHIPPED | OUTPATIENT
Start: 2024-04-12 | End: 2024-04-22

## 2024-04-12 NOTE — TELEPHONE ENCOUNTER
----- Message from Sonia Grimes sent at 4/12/2024 12:43 PM CDT -----  Regarding: call back  Contact: pt  Type: Needs Medical Advice  Who Called:  patient   Symptoms (please be specific):  sore throat  How long has patient had these symptoms:    Pharmacy name and phone #:    Best Call Back Number: [phone]    Additional Information: call to be seen sent several msgs no response back no improvement

## 2024-04-12 NOTE — TELEPHONE ENCOUNTER
Good afternoon,    With apologize for the delay as we have had clinic closure due to the weather and storms and the [place] area.  This is centered our ability to communicate a bit more efficiently.  I have sent Augmentin to the pharmacy which has a broader spectrum of coverage, then amoxicillin.  I do see that she has recently had an on demand virtual visit as well.  She may continue the cough medication that has been received via that visit which may also help with her sore throat.

## 2024-04-15 DIAGNOSIS — E11.9 TYPE 2 DIABETES MELLITUS WITHOUT COMPLICATION, WITHOUT LONG-TERM CURRENT USE OF INSULIN: ICD-10-CM

## 2024-04-15 DIAGNOSIS — E66.01 SEVERE OBESITY (BMI 35.0-39.9) WITH COMORBIDITY: ICD-10-CM

## 2024-04-15 NOTE — TELEPHONE ENCOUNTER
----- Message from Trace Ambrose PharmD sent at 4/15/2024 10:17 AM CDT -----  Hey Dr Jones-    Mrs Gan's 7.5mg Mounjaro is on backorder....     We have the 5mg and the 10 mg? Would you like her to go up or down?    If you could, just send in a new order for whichever MG you chose.    Thank you - have a good day!    STIVEN Ambrose, PharmD  [facility]  [phone]

## 2024-04-15 NOTE — TELEPHONE ENCOUNTER
Spoke to pt to verify Mounjaro dose. Pt states she started the 7.5mg about 1 month ago. Pt states she has been doing fine on the 7.5mg and has not had any side effects or concerns since starting. Please advise if dose can be increased or decreased due to pharmacy stock.        Pt also states she was recently sent Amox/clav for sore throat. Pt states her throat is still sore and wanting to know if she can get steroids or if she needs another appt to be swabbed. Please advise

## 2024-04-16 NOTE — TELEPHONE ENCOUNTER
----- Message from Ayala Fox sent at 4/16/2024 11:36 AM CDT -----  Regarding: Refill  Type:  RX Refill Request    Who Called: pt   Refill or New Rx:refill    RX Name and Strength:mounjaro  How is the patient currently taking it? (ex. 1XDay):as directed     Is this a 30 day or 90 day RX:90  Preferred Pharmacy with phone number:      [facility]  [address] [address]  Phone: [phone] Fax: [phone]       Local or Mail Order:local   Ordering Provider:dixon Ruelas Call Back Number:[phone] (home)     Additional Information: Requesting call back once sent to the pharmacy  please advise thank you

## 2024-04-17 ENCOUNTER — TELEPHONE (OUTPATIENT)
Dept: PHARMACY | Facility: CLINIC | Age: 44
End: 2024-04-17
Payer: COMMERCIAL

## 2024-04-18 ENCOUNTER — TELEPHONE (OUTPATIENT)
Dept: FAMILY MEDICINE | Facility: CLINIC | Age: 44
End: 2024-04-18

## 2024-04-18 ENCOUNTER — OFFICE VISIT (OUTPATIENT)
Dept: FAMILY MEDICINE | Facility: CLINIC | Age: 44
End: 2024-04-18
Payer: COMMERCIAL

## 2024-04-18 VITALS
HEART RATE: 91 BPM | SYSTOLIC BLOOD PRESSURE: 120 MMHG | DIASTOLIC BLOOD PRESSURE: 80 MMHG | OXYGEN SATURATION: 100 % | BODY MASS INDEX: 38.01 KG/M2 | TEMPERATURE: 98 F | HEIGHT: 69 IN | WEIGHT: 256.63 LBS

## 2024-04-18 DIAGNOSIS — J02.9 ACUTE PHARYNGITIS, UNSPECIFIED ETIOLOGY: Primary | ICD-10-CM

## 2024-04-18 PROCEDURE — 99213 OFFICE O/P EST LOW 20 MIN: CPT | Mod: S$GLB,,, | Performed by: FAMILY MEDICINE

## 2024-04-18 PROCEDURE — 3044F HG A1C LEVEL LT 7.0%: CPT | Mod: CPTII,S$GLB,, | Performed by: FAMILY MEDICINE

## 2024-04-18 PROCEDURE — 3079F DIAST BP 80-89 MM HG: CPT | Mod: CPTII,S$GLB,, | Performed by: FAMILY MEDICINE

## 2024-04-18 PROCEDURE — 3061F NEG MICROALBUMINURIA REV: CPT | Mod: CPTII,S$GLB,, | Performed by: FAMILY MEDICINE

## 2024-04-18 PROCEDURE — 2023F DILAT RTA XM W/O RTNOPTHY: CPT | Mod: CPTII,S$GLB,, | Performed by: FAMILY MEDICINE

## 2024-04-18 PROCEDURE — 1160F RVW MEDS BY RX/DR IN RCRD: CPT | Mod: CPTII,S$GLB,, | Performed by: FAMILY MEDICINE

## 2024-04-18 PROCEDURE — 3074F SYST BP LT 130 MM HG: CPT | Mod: CPTII,S$GLB,, | Performed by: FAMILY MEDICINE

## 2024-04-18 PROCEDURE — 87070 CULTURE OTHR SPECIMN AEROBIC: CPT | Performed by: FAMILY MEDICINE

## 2024-04-18 PROCEDURE — 3008F BODY MASS INDEX DOCD: CPT | Mod: CPTII,S$GLB,, | Performed by: FAMILY MEDICINE

## 2024-04-18 PROCEDURE — 1159F MED LIST DOCD IN RCRD: CPT | Mod: CPTII,S$GLB,, | Performed by: FAMILY MEDICINE

## 2024-04-18 PROCEDURE — 3066F NEPHROPATHY DOC TX: CPT | Mod: CPTII,S$GLB,, | Performed by: FAMILY MEDICINE

## 2024-04-18 PROCEDURE — 99999 PR PBB SHADOW E&M-EST. PATIENT-LVL IV: CPT | Mod: PBBFAC,,, | Performed by: FAMILY MEDICINE

## 2024-04-18 RX ORDER — PREDNISONE 20 MG/1
TABLET ORAL
Qty: 10 TABLET | Refills: 0 | Status: SHIPPED | OUTPATIENT
Start: 2024-04-18

## 2024-04-18 RX ORDER — SULFAMETHOXAZOLE AND TRIMETHOPRIM 800; 160 MG/1; MG/1
1 TABLET ORAL 2 TIMES DAILY
Qty: 20 TABLET | Refills: 0 | Status: SHIPPED | OUTPATIENT
Start: 2024-04-18 | End: 2024-04-28

## 2024-04-18 NOTE — TELEPHONE ENCOUNTER
No problem .please inform the patient that I did receive this request and I ordered the increased dose.  He is inform us if she does experience any side effects from the increased dose.  If so, she may have to increase the spacing of dosing.

## 2024-04-18 NOTE — PROGRESS NOTES
Subjective:       Patient ID: Elvia Cheney is a 43 y.o. female.    Chief Complaint: No chief complaint on file.    New to me patient here for UC visit.  Sore throat x 3 weeks; has had Amoxil x 7 days and now on day 4 of Augmentin; some relief with Prednisone she had.  At onset had fever to 101 initially.  One external UC visit as well -negative for Strep, Flu and Covid.  Pain is worse on left side.  Uncle ill with Pneumonia and sepsis at same time- he has MRSA.     Review of Systems   Constitutional:  Negative for fever.   HENT:  Positive for sore throat.    Respiratory:  Negative for shortness of breath.    Cardiovascular:  Negative for chest pain.   Gastrointestinal:  Negative for abdominal pain and nausea.   Skin:  Negative for rash.   All other systems reviewed and are negative.      Objective:      Physical Exam  Vitals reviewed.   Constitutional:       General: She is not in acute distress.     Appearance: She is well-developed. She is not ill-appearing, toxic-appearing or diaphoretic.   HENT:      Right Ear: Tympanic membrane normal. Tympanic membrane is not erythematous.      Left Ear: Tympanic membrane normal. Tympanic membrane is not erythematous.      Nose: Mucosal edema present.      Right Sinus: No maxillary sinus tenderness.      Left Sinus: No maxillary sinus tenderness.      Mouth/Throat:      Pharynx: Uvula midline. Posterior oropharyngeal erythema present. No pharyngeal swelling or uvula swelling.      Tonsils: No tonsillar exudate. 1+ on the right. 1+ on the left.   Cardiovascular:      Rate and Rhythm: Normal rate and regular rhythm.      Heart sounds: No murmur heard.  Pulmonary:      Effort: Pulmonary effort is normal.      Breath sounds: Normal breath sounds.   Musculoskeletal:      Cervical back: Neck supple.   Lymphadenopathy:      Cervical: No cervical adenopathy.   Skin:     General: Skin is warm and dry.   Neurological:      Mental Status: She is alert.         Assessment:     She does not  look ill or toxic in any way.  1. Acute pharyngitis, unspecified etiology      Will cover for MRSA; try a culture but low yield being on Augmentin already.  Plan:       Acute pharyngitis, unspecified etiology  -     Throat culture  -     sulfamethoxazole-trimethoprim 800-160mg (BACTRIM DS) 800-160 mg Tab; Take 1 tablet by mouth 2 (two) times daily. for 10 days  Dispense: 20 tablet; Refill: 0  -     predniSONE (DELTASONE) 20 MG tablet; take 1 tablet by mouth 2 times daily for 3 days then take 1 tablet by mouth once a day  Dispense: 10 tablet; Refill: 0      Patient Instructions   Push fluids intake.  Drink plenty of water.     Contact your PCP if any worsening or for any new concerns as we discussed.

## 2024-04-18 NOTE — TELEPHONE ENCOUNTER
----- Message from Gemma Back sent at 4/18/2024  9:12 AM CDT -----  Type: Needs Medical Advice  Who Called:  pt       Best Call Back Number: [phone] (home)     Additional Information: pt calling in regards to being on 2 different antibiotics for sore throat and has not gotten better , says she has had it for 21 days now . Pt also is requesting a refill on her diabetes medication please advise     [facility] [address] [address]  Phone: [phone] Fax: [phone]

## 2024-04-18 NOTE — TELEPHONE ENCOUNTER
Spoke to pt scheduled appt with [facility]. Offered sooner appt with available NP/MD pt declined and decided to stay with care team.    Pt also states mounjaro is on backorder and requesting an increase in dose. Please see pt message below and advise      Also I have yet to get my mounjaro .75 because there is a  issue.Is there anyway you can bump it up to 10 or something.I have been without it for a while.

## 2024-04-19 NOTE — TELEPHONE ENCOUNTER
Please inform the patient that I did receive this request and I ordered the increased dose on 4-17-24.  Please inform us if she does experience any side effects from the increased dose.  If so, she may have to increase the spacing of dosing.

## 2024-04-22 LAB — BACTERIA THROAT CULT: NORMAL

## 2024-05-14 ENCOUNTER — PATIENT MESSAGE (OUTPATIENT)
Dept: FAMILY MEDICINE | Facility: CLINIC | Age: 44
End: 2024-05-14
Payer: COMMERCIAL

## 2024-05-14 DIAGNOSIS — E11.9 TYPE 2 DIABETES MELLITUS WITHOUT COMPLICATION, WITHOUT LONG-TERM CURRENT USE OF INSULIN: ICD-10-CM

## 2024-05-14 NOTE — TELEPHONE ENCOUNTER
Last office visit was 3/12/2024; rx pended fo approval per PCP pt notified of status via portal message response.

## 2024-05-15 RX ORDER — ONDANSETRON 8 MG/1
8 TABLET, ORALLY DISINTEGRATING ORAL 2 TIMES DAILY
Qty: 60 TABLET | Refills: 0 | Status: SHIPPED | OUTPATIENT
Start: 2024-05-15 | End: 2024-06-14

## 2024-05-24 NOTE — PATIENT INSTRUCTIONS
Your Diabetes Foot Care Program    Every day you depend on your feet to keep you moving. But when you have diabetes, your feet need special care. Even a small foot problem can become very serious. So dont take your feet for granted. By working with your diabetes healthcare team, you can learn how to protect your feet and keep them healthy.  Evaluating your feet  An evaluation helps your healthcare provider check the condition of your feet. The evaluation includes a review of your diabetes history and overall health. It may also include a foot exam, X-rays, or other tests. These can help show problems beneath the skin that you cant see or feel.  Medical history  You will be asked about your overall health and any history of foot problems. Youll also discuss your diabetes history, such as whether your blood sugar level has changed over time. It also includes questions about sensations of pain, tingling, pins and needles, or numbness. Your healthcare provider will also want to know if you have high blood pressure and heart disease, or if you smoke. Be sure to mention any medicines (including over-the-counter), supplements, or herbal remedies you take.  Foot exam  A foot exam checks the condition of different parts of your foot. First, your skin and nails are examined for any signs of infection. Blood flow is checked by feeling for the pulses in each foot. You may also have tests to study the nerves in the foot. These include using a small filament (wire) to see how sensitive your feet are. In certain cases, you will be asked to walk a short distance to check for bone, joint, and muscle problems.  Diagnostic tests  If needed, your healthcare provider will suggest certain tests to learn more about your feet. These include:  Doppler tests to measure blood flow in the feet and lower leg.  X-rays, which can show bone or joint problems.  Other imaging tests, such as an MRI (magnetic resonance imaging), bone scan, and CT  (computed tomography) scan. These can help show bone infections.  Other tests, such as vascular tests, which study the blood flow in your feet and legs. You may also have nerve studies to learn how sensitive your feet are.  Creating a foot care program  Based on the evaluation, your healthcare provider will create a foot care program for you. Your program may be as simple as starting a daily self-care routine and changing the types of shoes your wear. It may also involve treating minor foot problems, such as a corn or blister. In some cases, surgery will be needed to treat an infection or mechanical problems, such as hammer toes.  Preventing problems  When you have diabetes, its easier to prevent problems than to treat them later on. So see your healthcare team for regular checkups and foot care. Your healthcare team can also help you learn more about caring for your feet at home. For example, you may be told to avoid walking barefoot. Or you may be told that special footwear is needed to protect your feet.  Have regular checkups  Foot problems can develop quickly. So be sure to follow your healthcare teams schedule for regular checkups. During office visits, take off your shoes and socks as soon as you get in the exam room. Ask your healthcare provider to examine your feet for problems. This will make it easier to find and treat small skin irritations before they get worse. Regular checkups can also help keep track of the blood flow and feeling in your feet. If you have neuropathy (lack of feeling in your feet), you will need to have checkups more often.  Learn about self-care  The more you know about diabetes and your feet, the easier it will be to prevent problems. Members of your healthcare team can teach you how to inspect your feet and teach you to look for warning signs. They can also give you other foot care tips. During office visits, be sure to ask any questions you have.  Date Last Reviewed: 7/1/2016  ©  0812-7941 [employer]. [address] [place] [address], [place] [address]. All rights reserved. This information is not intended as a substitute for professional medical care. Always follow your healthcare professional's instructions.

## 2024-05-27 ENCOUNTER — OFFICE VISIT (OUTPATIENT)
Dept: URGENT CARE | Facility: CLINIC | Age: 44
End: 2024-05-27
Payer: COMMERCIAL

## 2024-05-27 VITALS
OXYGEN SATURATION: 96 % | HEIGHT: 69 IN | SYSTOLIC BLOOD PRESSURE: 142 MMHG | TEMPERATURE: 98 F | WEIGHT: 247 LBS | RESPIRATION RATE: 18 BRPM | DIASTOLIC BLOOD PRESSURE: 90 MMHG | HEART RATE: 87 BPM | BODY MASS INDEX: 36.58 KG/M2

## 2024-05-27 DIAGNOSIS — N30.00 ACUTE CYSTITIS WITHOUT HEMATURIA: ICD-10-CM

## 2024-05-27 DIAGNOSIS — S76.911A MUSCLE STRAIN OF RIGHT THIGH, INITIAL ENCOUNTER: ICD-10-CM

## 2024-05-27 DIAGNOSIS — Z32.02 URINE PREGNANCY TEST NEGATIVE: ICD-10-CM

## 2024-05-27 DIAGNOSIS — N39.0 URINARY TRACT INFECTION WITHOUT HEMATURIA, SITE UNSPECIFIED: ICD-10-CM

## 2024-05-27 DIAGNOSIS — S39.011A STRAIN OF MUSCLE OF RIGHT GROIN REGION: Primary | ICD-10-CM

## 2024-05-27 LAB
B-HCG UR QL: NEGATIVE
BILIRUB UR QL STRIP: NEGATIVE
CTP QC/QA: YES
GLUCOSE UR QL STRIP: NEGATIVE
KETONES UR QL STRIP: NEGATIVE
LEUKOCYTE ESTERASE UR QL STRIP: POSITIVE
PH, POC UA: 6
POC BLOOD, URINE: NEGATIVE
POC NITRATES, URINE: POSITIVE
PROT UR QL STRIP: NEGATIVE
SP GR UR STRIP: 1.03 (ref 1–1.03)
UROBILINOGEN UR STRIP-ACNC: 0.2 (ref 0.1–1.1)

## 2024-05-27 PROCEDURE — 81025 URINE PREGNANCY TEST: CPT | Mod: S$GLB,,, | Performed by: NURSE PRACTITIONER

## 2024-05-27 PROCEDURE — 96372 THER/PROPH/DIAG INJ SC/IM: CPT | Mod: S$GLB,,, | Performed by: EMERGENCY MEDICINE

## 2024-05-27 PROCEDURE — 99214 OFFICE O/P EST MOD 30 MIN: CPT | Mod: 25,S$GLB,, | Performed by: NURSE PRACTITIONER

## 2024-05-27 PROCEDURE — 81003 URINALYSIS AUTO W/O SCOPE: CPT | Mod: QW,S$GLB,, | Performed by: NURSE PRACTITIONER

## 2024-05-27 RX ORDER — CEPHALEXIN 500 MG/1
500 CAPSULE ORAL EVERY 12 HOURS
Qty: 14 CAPSULE | Refills: 0 | Status: SHIPPED | OUTPATIENT
Start: 2024-05-27 | End: 2024-06-03

## 2024-05-27 RX ORDER — CYCLOBENZAPRINE HCL 5 MG
5 TABLET ORAL NIGHTLY PRN
Qty: 5 TABLET | Refills: 0 | Status: SHIPPED | OUTPATIENT
Start: 2024-05-27 | End: 2024-06-01

## 2024-05-27 RX ORDER — NAPROXEN 500 MG/1
500 TABLET ORAL 2 TIMES DAILY PRN
Qty: 14 TABLET | Refills: 0 | Status: SHIPPED | OUTPATIENT
Start: 2024-05-27 | End: 2024-06-03

## 2024-05-27 RX ORDER — KETOROLAC TROMETHAMINE 30 MG/ML
30 INJECTION, SOLUTION INTRAMUSCULAR; INTRAVENOUS
Status: COMPLETED | OUTPATIENT
Start: 2024-05-27 | End: 2024-05-27

## 2024-05-27 RX ADMIN — KETOROLAC TROMETHAMINE 30 MG: 30 INJECTION, SOLUTION INTRAMUSCULAR; INTRAVENOUS at 04:05

## 2024-05-27 NOTE — PROGRESS NOTES
"Subjective:      Patient ID: Elvia Cheney is a 43 y.o. female.    Vitals:  height is 5' 9" (1.753 m) and weight is 112 kg (247 lb). Her temperature is 98 °F (36.7 °C). Her blood pressure is 142/90 (abnormal) and her pulse is 87. Her respiration is 18 and oxygen saturation is 96%.     Chief Complaint: Groin Pain and Urinary Tract Infection    43-year-old female seen today for right medial thigh pain radiating into right hip.  She reports recently joining a gym and losing 20 lb and states her hip pain began suddenly yesterday.  She denies working out yesterday and states it was her day off.  She also developed some dysuria yesterday with right lower quadrant abdominal pain.  There has been no fever, nausea, diarrhea, or vomiting.    Groin Pain  The current episode started yesterday. The problem occurs constantly. The pain is moderate. The problem affects the right side. Associated symptoms include abdominal pain, dysuria and urgency. Pertinent negatives include no chills, diarrhea, fever, flank pain, frequency, nausea or vomiting. Nothing aggravates the symptoms. She has tried NSAIDs for the symptoms. The treatment provided no relief.   Urinary Tract Infection   This is a new problem. The current episode started yesterday. The problem occurs every urination. The quality of the pain is described as aching. The pain is at a severity of 7/10. The pain is moderate. Associated symptoms include urgency and withholding. Pertinent negatives include no chills, flank pain, frequency, nausea or vomiting. She has tried nothing for the symptoms. The treatment provided no relief.       Constitution: Negative for chills and fever.   Cardiovascular:  Negative for chest pain, palpitations and sob on exertion.   Gastrointestinal:  Positive for abdominal pain. Negative for nausea, vomiting and diarrhea.   Genitourinary:  Positive for dysuria and urgency. Negative for frequency and flank pain.   Musculoskeletal:  Positive for pain and " muscle ache. Negative for trauma.   Skin:  Negative for erythema and bruising.   Neurological:  Negative for dizziness, light-headedness and passing out.      Objective:     Physical Exam   Constitutional: She is oriented to person, place, and time. She appears well-developed. She is cooperative.  Non-toxic appearance. She does not appear ill. No distress.   HENT:   Head: Normocephalic and atraumatic.   Ears:   Right Ear: Hearing and external ear normal.   Left Ear: Hearing and external ear normal.   Nose: Nose normal. No mucosal edema, rhinorrhea, nasal deformity or congestion. No epistaxis. Right sinus exhibits no maxillary sinus tenderness and no frontal sinus tenderness. Left sinus exhibits no maxillary sinus tenderness and no frontal sinus tenderness.   Mouth/Throat: Uvula is midline, oropharynx is clear and moist and mucous membranes are normal. Mucous membranes are moist. No trismus in the jaw. Normal dentition. No uvula swelling. No oropharyngeal exudate, posterior oropharyngeal edema or posterior oropharyngeal erythema.   Eyes: Conjunctivae and lids are normal. No scleral icterus.   Neck: Trachea normal and phonation normal. Neck supple. No edema present. No erythema present. No neck rigidity present.   Cardiovascular: Normal rate, regular rhythm, normal heart sounds and normal pulses.   Pulmonary/Chest: Effort normal and breath sounds normal. No stridor. No respiratory distress. She has no decreased breath sounds. She has no rhonchi.   Abdominal: Normal appearance and bowel sounds are normal. Soft. protuberant There is no splenomegaly or hepatomegaly. There is abdominal tenderness in the right lower quadrant. There is no guarding, no tenderness at McBurney's point, negative Miranda's sign, no left CVA tenderness, negative Rovsing's sign, negative psoas sign, no right CVA tenderness and negative obturator sign. No hernia.     Musculoskeletal: Normal range of motion.         General: No deformity. Normal  range of motion.        Legs:    Neurological: no focal deficit. She is alert and oriented to person, place, and time. She exhibits normal muscle tone. Coordination normal.   Skin: Skin is warm, dry, intact, not diaphoretic and not pale. Capillary refill takes 2 to 3 seconds. No erythema   Psychiatric: Her speech is normal and behavior is normal. Judgment and thought content normal.   Nursing note and vitals reviewed.      Assessment:     1. Strain of muscle of right groin region    2. Urinary tract infection without hematuria, site unspecified    3. Muscle strain of right thigh, initial encounter    4. Acute cystitis without hematuria    5. Urine pregnancy test negative        Plan:       Strain of muscle of right groin region  -     ketorolac injection 30 mg  -     naproxen (NAPROSYN) 500 MG tablet; Take 1 tablet (500 mg total) by mouth 2 (two) times daily as needed (pain).  Dispense: 14 tablet; Refill: 0  -     cyclobenzaprine (FLEXERIL) 5 MG tablet; Take 1 tablet (5 mg total) by mouth nightly as needed for Muscle spasms.  Dispense: 5 tablet; Refill: 0    Urinary tract infection without hematuria, site unspecified  -     POCT Urinalysis, Dipstick, Automated, W/O Scope  -     cephALEXin (KEFLEX) 500 MG capsule; Take 1 capsule (500 mg total) by mouth every 12 (twelve) hours. for 7 days  Dispense: 14 capsule; Refill: 0  -     POCT urine pregnancy    Muscle strain of right thigh, initial encounter  -     ketorolac injection 30 mg  -     naproxen (NAPROSYN) 500 MG tablet; Take 1 tablet (500 mg total) by mouth 2 (two) times daily as needed (pain).  Dispense: 14 tablet; Refill: 0  -     cyclobenzaprine (FLEXERIL) 5 MG tablet; Take 1 tablet (5 mg total) by mouth nightly as needed for Muscle spasms.  Dispense: 5 tablet; Refill: 0    Acute cystitis without hematuria  -     cephALEXin (KEFLEX) 500 MG capsule; Take 1 capsule (500 mg total) by mouth every 12 (twelve) hours. for 7 days  Dispense: 14 capsule; Refill: 0  -      CULTURE, URINE    Urine pregnancy test negative    Urinalysis-Leukocyte, nitrite +. Blood and protein negative.    The test results and physical exam findings were discussed with the patient and all questions answered. I have low suspicion for appendicitis.  We did discuss the possibility of pancreatitis due to her just starting Mounjaro.  I advised her on strict observation with ER precautions.  I feel that the abdominal pain is most likely ureteral or referred bladder pain due to the UTI. She does have some definite pain and tenderness over the area of the Sartorius muscle that is worse with movement. We discussed symptom monitoring, conservative care methods, medication use, and follow up orders. she verbalized understanding and agreement with the plan of care.

## 2024-05-27 NOTE — PATIENT INSTRUCTIONS
Increase clear fluid intake  Start Keflex and take as prescribed.  You may take each dose with food to limit GI upset  May apply ice to affected area for 15 minutes every 2 hours.  After 48 hours may progress to moist heat or heating pad.  Take care not to fall sleep on heating pad as this may cause severe burns  Rest until better  Take naproxen as directed.  Take each dose with a full meal.  Do not take any additional NSAIDs while taking naproxen.  You may take additional Tylenol as needed between doses    Cyclobenzaprine at bedtime as needed for muscle spasms or tightness  Follow-up with primary care provider   Go directly to the emergency room for development of fever, worsening abdominal pain, lack of improvement, or other emergent concerns  Return to clinic for new or worse symptoms

## 2024-05-27 NOTE — LETTER
May 27, 2024      [facility]  [address]  [place] [address]  Phone: [phone]       Patient: Elvia Cheney   YOB: 1980  Date of Visit: 05/27/2024    To Whom It May Concern:    Ramakrishna Cheney  was at [facility] on 05/27/2024. The patient may return to work/school on 05/30/2024 with no restrictions. If you have any questions or concerns, or if I can be of further assistance, please do not hesitate to contact me.    Sincerely,    Franck Lugo Jr., FNP-C

## 2024-06-01 LAB
BACTERIA UR CULT: ABNORMAL
OTHER ANTIBIOTIC SUSC ISLT: ABNORMAL

## 2024-06-06 ENCOUNTER — OFFICE VISIT (OUTPATIENT)
Dept: PODIATRY | Facility: CLINIC | Age: 44
End: 2024-06-06
Payer: COMMERCIAL

## 2024-06-06 VITALS — HEIGHT: 69 IN | BODY MASS INDEX: 36.7 KG/M2 | WEIGHT: 247.81 LBS

## 2024-06-06 DIAGNOSIS — E11.9 TYPE 2 DIABETES MELLITUS WITHOUT COMPLICATION, WITHOUT LONG-TERM CURRENT USE OF INSULIN: ICD-10-CM

## 2024-06-06 DIAGNOSIS — E66.01 SEVERE OBESITY (BMI 35.0-39.9) WITH COMORBIDITY: ICD-10-CM

## 2024-06-06 DIAGNOSIS — E66.9 OBESITY (BMI 35.0-39.9 WITHOUT COMORBIDITY): ICD-10-CM

## 2024-06-06 DIAGNOSIS — E11.9 ENCOUNTER FOR DIABETIC FOOT EXAM: Primary | ICD-10-CM

## 2024-06-06 PROCEDURE — 99203 OFFICE O/P NEW LOW 30 MIN: CPT | Mod: S$GLB,,, | Performed by: PODIATRIST

## 2024-06-06 PROCEDURE — 3044F HG A1C LEVEL LT 7.0%: CPT | Mod: CPTII,S$GLB,, | Performed by: PODIATRIST

## 2024-06-06 PROCEDURE — 3061F NEG MICROALBUMINURIA REV: CPT | Mod: CPTII,S$GLB,, | Performed by: PODIATRIST

## 2024-06-06 PROCEDURE — 3066F NEPHROPATHY DOC TX: CPT | Mod: CPTII,S$GLB,, | Performed by: PODIATRIST

## 2024-06-06 PROCEDURE — 3008F BODY MASS INDEX DOCD: CPT | Mod: CPTII,S$GLB,, | Performed by: PODIATRIST

## 2024-06-06 PROCEDURE — 99999 PR PBB SHADOW E&M-EST. PATIENT-LVL IV: CPT | Mod: PBBFAC,,, | Performed by: PODIATRIST

## 2024-06-06 NOTE — LETTER
June 6, 2024      [facility]  [address]  [address]  [place] [address]  Phone: [phone]  Fax: [phone]       Patient: Elvia Cheney   YOB: 1980  Date of Visit: 06/06/2024    To Whom It May Concern:    Ramakrishna Cheney  was at [facility] on 06/06/2024. The patient may return to work on 6/7/2024 with no restrictions. If you have any questions or concerns, or if I can be of further assistance, please do not hesitate to contact me.    Sincerely,  Electronically Signed by: MITCHEL Chen LPN

## 2024-06-06 NOTE — PROGRESS NOTES
"  [address] [place] [address]  Phone: [phone]   [phone]    Patient's PCP:Paul Jones DO  Referring Provider: Dr. Paul Jones    Subjective:      Chief Complaint:: Routine Foot Care (Ref by pcp  for  routine diabetic foot check with no complications not on insulin )    SHANE Cheney is a 43 y.o. female who presents today for a diabetic foot exam.    Systemic Doctor: Dr Charleen Jones   Date Last Seen: March 2024  Blood Sugar: 98 - today   Hemoglobin A1c: 6.0 - 03/12/2024    Vitals:    06/06/24 1344   Weight: 112.4 kg (247 lb 12.8 oz)   Height: 5' 9" (1.753 m)   PainSc: 0-No pain      Shoe Size:     No past surgical history on file.  Past Medical History:   Diagnosis Date    Diabetes mellitus, type 2     Hyperlipidemia      Family History   Problem Relation Name Age of Onset    Diabetes Maternal Grandmother      Hypertension Maternal Grandmother      Diabetes Paternal Grandfather      Hypertension Paternal Grandfather      Hyperthyroidism Maternal Aunt          Social History:   Marital Status:   Alcohol History:  reports that she does not currently use alcohol.  Tobacco History:  reports that she has never smoked. She has never used smokeless tobacco.  Drug History:  reports no history of drug use.    Review of patient's allergies indicates:  No Known Allergies    Current Outpatient Medications   Medication Sig Dispense Refill    albuterol (PROVENTIL/VENTOLIN HFA) 90 mcg/actuation inhaler Inhale 1 puff into the lungs every 4 (four) hours as needed for Shortness of Breath. 18 g 3    alcohol swabs PadM Apply 1 each topically 3 (three) times daily. 300 each 3    blood sugar diagnostic (CONTOUR NEXT TEST STRIPS) Strp 1 strip by Misc.(Non-Drug; Combo Route) route 3 (three) times daily. 300 strip 6    blood sugar diagnostic Strp 1 strip by Misc.(Non-Drug; Combo Route) route 2 (two) times a day. Use with meter to check blood glucose. 200 strip 2    " blood-glucose meter,continuous (DEXCOM G6 ) Misc 1 Units by Misc.(Non-Drug; Combo Route) route continuous. 1 each 0    blood-glucose sensor (DEXCOM G6 SENSOR) Deepa 3 Devices by Misc.(Non-Drug; Combo Route) route every 10 days. 3 each 11    blood-glucose transmitter (DEXCOM G6 TRANSMITTER) Deepa 1 Device by Misc.(Non-Drug; Combo Route) route continuous. 1 each 3    lancets (TRUEPLUS LANCETS) 33 gauge Misc as directed 200 each 5    norethindrone-ethinyl estradiol (BLISOVI FE 1/20, 28,) 1 mg-20 mcg (21)/75 mg (7) per tablet Take 1 tablet by mouth once daily. 90 tablet 3    ondansetron (ZOFRAN-ODT) 8 MG TbDL Take 1 tablet (8 mg total) by mouth 2 (two) times daily. 60 tablet 0    polyethylene glycol (GLYCOLAX) 17 gram/dose powder Take 17 g by mouth 2 (two) times daily as needed for Constipation. For Constipation 510 g 0    tirzepatide 10 mg/0.5 mL PnIj Inject 10 mg into the skin every 7 days. 4 Pen 3    (Magic mouthwash) 1:1:1 diphenhydrAMINE(Benadryl) 12.5mg/5ml liq, aluminum & magnesium hydroxide-simethicone (Maalox), LIDOcaine viscous 2% Swish and spit 5 mLs every 4 (four) hours as needed (sore throat). for mouth sores (Patient not taking: Reported on 6/6/2024) 118 mL 0    atorvastatin (LIPITOR) 20 MG tablet Take 1 tablet (20 mg total) by mouth once daily. (Patient not taking: Reported on 6/6/2024) 90 tablet 3    cetirizine (ZYRTEC) 10 MG tablet Take 10 mg by mouth. (Patient not taking: Reported on 6/6/2024)      fluticasone propionate (FLONASE) 50 mcg/actuation nasal spray 1 spray (50 mcg total) by Each Nostril route once daily. (Patient not taking: Reported on 6/6/2024) 16 g 3    metFORMIN (GLUCOPHAGE-XR) 750 MG ER 24hr tablet Take 1 tablet (750 mg total) by mouth daily with breakfast. (Patient not taking: Reported on 6/6/2024) 90 tablet 1    predniSONE (DELTASONE) 20 MG tablet take 1 tablet by mouth 2 times daily for 3 days then take 1 tablet by mouth once a day (Patient not taking: Reported on 6/6/2024) 10  tablet 0     No current facility-administered medications for this visit.       Review of Systems   Constitutional:  Negative for chills, fatigue, fever and unexpected weight change.   HENT:  Negative for hearing loss and trouble swallowing.    Eyes:  Negative for photophobia and visual disturbance.   Respiratory:  Negative for cough, shortness of breath and wheezing.    Cardiovascular:  Negative for chest pain, palpitations and leg swelling.   Gastrointestinal:  Negative for abdominal pain and nausea.   Genitourinary:  Negative for dysuria and frequency.   Musculoskeletal:  Negative for arthralgias, back pain, gait problem, joint swelling and myalgias.   Skin:  Negative for rash and wound.   Neurological:  Negative for tremors, seizures, speech difficulty, weakness and headaches.   Hematological:  Does not bruise/bleed easily.         Objective:        Physical Exam:   Foot Exam  Physical Exam  Ortho/SPM Exam   Constitutional: Patient is oriented to person, place, and time. Patient appears well-developed and well-nourished. No acute distress.      Psychiatric: Patient has a normal mood and affect. Patient's speech is normal and behavior is normal. Judgment is normal. Cognition and memory are normal.     Skin is normal age and health appropriate color, turgor, texture, and temperature bilateral lower extremities without ulceration, hyperpigmentation, discoloration, masses nodules or cords palpated.  No ecchymosis, erythema, edema, or cardinal signs of infection bilateral lower extremities.    Adequate joint range of motion without pain, limitation, nor crepitation Bilateral feet and ankle joints. Muscle strength is 5/5 in all groups bilaterally.     Protective sensation intact. Pain sensation intact bilateral feet and legs.    DP and PT pulses 2/4 bilateral, capillary refill 3 seconds all toes/distal feet, all toes/both feet warm to touch.   Negative lower extremity edema bilateral.    Imaging:             Assessment:       1. Encounter for diabetic foot exam    2. Type 2 diabetes mellitus without complication, without long-term current use of insulin    3. Obesity (BMI 35.0-39.9 without comorbidity)    4. Severe obesity (BMI 35.0-39.9) with comorbidity      Plan:   Encounter for diabetic foot exam  -      DIABETES FOOT EXAM    Type 2 diabetes mellitus without complication, without long-term current use of insulin  -     Ambulatory referral/consult to Podiatry    Obesity (BMI 35.0-39.9 without comorbidity)    Severe obesity (BMI 35.0-39.9) with comorbidity      No follow-ups on file.    Procedures          Counseling:     I provided patient education verbally regarding:   Patient diagnosis, treatment options, as well as alternatives, risks, and benefits.     This note was created using Dragon voice recognition software that occasionally misinterpreted phrases or words.       The risk of complications, morbidity, and mortality of patient management decisions have been made at the time of this visit. These are associated with the patient's problems, diagnostic procedures and treatment options. This includes the possible management options selected and those considered but not selected by the patient after shared medical decision making we discussed with the patient.     Patient should call the office immediately if any signs of infection, such as fever, chills, sweats, increased redness or pain.    Patient was instructed to call the clinic or go to the emergency department if their symptoms do not improve, worsens, or if new symptoms develop.  Patient was advised that if any increased swelling, pain, or numbness arise to go immediately to the ED. Patient knows to call any time if an emergency arises. Shared decision making occurred and patient verbalized understanding in agreement with this plan.

## 2024-06-19 DIAGNOSIS — Z12.31 OTHER SCREENING MAMMOGRAM: ICD-10-CM

## 2024-06-21 ENCOUNTER — E-VISIT (OUTPATIENT)
Dept: FAMILY MEDICINE | Facility: CLINIC | Age: 44
End: 2024-06-21
Payer: COMMERCIAL

## 2024-06-21 DIAGNOSIS — N30.00 ACUTE CYSTITIS WITHOUT HEMATURIA: Primary | ICD-10-CM

## 2024-06-21 RX ORDER — NITROFURANTOIN 25; 75 MG/1; MG/1
100 CAPSULE ORAL 2 TIMES DAILY
Qty: 10 CAPSULE | Refills: 0 | Status: SHIPPED | OUTPATIENT
Start: 2024-06-21 | End: 2024-06-26

## 2024-06-21 RX ORDER — PHENAZOPYRIDINE HYDROCHLORIDE 200 MG/1
200 TABLET, FILM COATED ORAL 3 TIMES DAILY PRN
Qty: 30 TABLET | Refills: 0 | Status: SHIPPED | OUTPATIENT
Start: 2024-06-21 | End: 2024-07-01

## 2024-06-21 NOTE — PROGRESS NOTES
Dear Elvia,     Thank you for reaching out to me for an e-visit so we can address your concern regarding: Urinary Tract Infection (Entered automatically based on patient selection in Patient Portal.)     I have reviewed your chart and read the answers to the questionnaire that you completed.  Based on the information provided, my diagnosis and recommendations are as follows:    Visit Diagnosis    1. Acute cystitis without hematuria      Medications Ordered                [facility]   [address], [facility] [address]    Telephone: [phone]   Fax: [phone]   Hours: Mon-Fri, 8a-5:30p                         Internal Pharmacy (2 of 2)              nitrofurantoin, macrocrystal-monohydrate, (MACROBID) 100 MG capsule    Sig: Take 1 capsule (100 mg total) by mouth 2 (two) times daily. for 5 days       Start: 6/21/24     Quantity: 10 capsule Refills: 0                         phenazopyridine (PYRIDIUM) 200 MG tablet    Sig: Take 1 tablet (200 mg total) by mouth 3 (three) times daily as needed for Pain (For pain on urination).       Start: 6/21/24     Quantity: 30 tablet Refills: 0                            Please let me know if there is something else that you need.  If you send additional messages concerning this illness, please use this message thread to communicate.      Paul Jones, DO

## 2024-07-05 DIAGNOSIS — E11.9 TYPE 2 DIABETES MELLITUS WITHOUT COMPLICATION, WITHOUT LONG-TERM CURRENT USE OF INSULIN: ICD-10-CM

## 2024-07-05 NOTE — TELEPHONE ENCOUNTER
----- Message from Baljeetsilvia Estradaneha Sung sent at 7/5/2024  2:37 PM CDT -----  Type:  RX Refill Request    Who Called:  pt   Refill or New Rx:  refill   RX Name and Strength:  zofran   How is the patient currently taking it? (ex. 1XDay):  as directed   Preferred Pharmacy with phone number:    [facility] [address] [address]  Phone: [phone] Fax: [phone]  Local or Mail Order:  local   Ordering Provider:  Robert Ruelas Call Back Number:  [phone]  Additional Information:  pt stated she would like to be advised in regards to getting refill sent asap to pharm listed please call back to advise with status update asap thanks!

## 2024-07-05 NOTE — TELEPHONE ENCOUNTER
Refill Routing Note   Medication(s) are not appropriate for processing by [facility] for the following reason(s):        Non-participating provider  Outside of protocol    [facility] action(s):  Route               Appointments  past 12m or future 3m with PCP    Date Provider   Last Visit   6/21/2024 Paul Jones, DO   Next Visit   9/18/2024 Paul Jones, DO   ED visits in past 90 days: 0        Note composed:3:05 PM 07/05/2024

## 2024-07-06 RX ORDER — ONDANSETRON 8 MG/1
8 TABLET, ORALLY DISINTEGRATING ORAL 2 TIMES DAILY
Qty: 60 TABLET | Refills: 0 | Status: SHIPPED | OUTPATIENT
Start: 2024-07-06 | End: 2024-08-10

## 2024-08-26 ENCOUNTER — PATIENT MESSAGE (OUTPATIENT)
Dept: FAMILY MEDICINE | Facility: CLINIC | Age: 44
End: 2024-08-26
Payer: COMMERCIAL

## 2024-08-26 NOTE — TELEPHONE ENCOUNTER
Please have patient set appt with DAYAN to discuss medication changes.     Thank you,     Dr. Jones

## 2024-08-27 ENCOUNTER — OFFICE VISIT (OUTPATIENT)
Dept: FAMILY MEDICINE | Facility: CLINIC | Age: 44
End: 2024-08-27
Payer: COMMERCIAL

## 2024-08-27 DIAGNOSIS — E11.9 TYPE 2 DIABETES MELLITUS WITHOUT COMPLICATION, WITHOUT LONG-TERM CURRENT USE OF INSULIN: Primary | ICD-10-CM

## 2024-08-27 DIAGNOSIS — R11.0 NAUSEA: ICD-10-CM

## 2024-08-27 PROCEDURE — 3044F HG A1C LEVEL LT 7.0%: CPT | Mod: CPTII,95,,

## 2024-08-27 PROCEDURE — 99213 OFFICE O/P EST LOW 20 MIN: CPT | Mod: 95,,,

## 2024-08-27 PROCEDURE — 1160F RVW MEDS BY RX/DR IN RCRD: CPT | Mod: CPTII,95,,

## 2024-08-27 PROCEDURE — 3061F NEG MICROALBUMINURIA REV: CPT | Mod: CPTII,95,,

## 2024-08-27 PROCEDURE — 3066F NEPHROPATHY DOC TX: CPT | Mod: CPTII,95,,

## 2024-08-27 PROCEDURE — 2023F DILAT RTA XM W/O RTNOPTHY: CPT | Mod: CPTII,95,,

## 2024-08-27 PROCEDURE — 1159F MED LIST DOCD IN RCRD: CPT | Mod: CPTII,95,,

## 2024-08-27 RX ORDER — ONDANSETRON 4 MG/1
4 TABLET, ORALLY DISINTEGRATING ORAL EVERY 8 HOURS PRN
Qty: 20 TABLET | Refills: 0 | Status: SHIPPED | OUTPATIENT
Start: 2024-08-27

## 2024-08-27 RX ORDER — TIRZEPATIDE 12.5 MG/.5ML
12.5 INJECTION, SOLUTION SUBCUTANEOUS
Qty: 4 PEN | Refills: 3 | Status: SHIPPED | OUTPATIENT
Start: 2024-08-27

## 2024-08-27 NOTE — PATIENT INSTRUCTIONS
Thank you for allowing me to be part of your healthcare team at [facility]. It is a pleasure to care for you today.   Please take all of your medications as instructed and follow all new instructions from your visit today.  If you received labs or medical tests today you should hear information about results or scheduling either by phone or mychart within approximately a week.   If you have any questions or concerns please do not hesitate to call. Have a blessed day and I hope to see you again soon.  BAY Garcia      WE STRIVE FOR 5'S!!!        We strive for exceptional care. Please fill out a survey if you received 5 star service.

## 2024-08-27 NOTE — PROGRESS NOTES
Subjective:       Patient ID: Elvia Cheney is a 44 y.o. female.  The patient location is: [place], [place]  The chief complaint leading to consultation is: Elevated blood sugar    Visit type: audiovisual    Face to Face time with patient: 6  15 minutes of total time spent on the encounter, which includes face to face time and non-face to face time preparing to see the patient (eg, review of tests), Obtaining and/or reviewing separately obtained history, Documenting clinical information in the electronic or other health record, Independently interpreting results (not separately reported) and communicating results to the patient/family/caregiver, or Care coordination (not separately reported).         Each patient to whom he or she provides medical services by telemedicine is:  (1) informed of the relationship between the physician and patient and the respective role of any other health care provider with respect to management of the patient; and (2) notified that he or she may decline to receive medical services by telemedicine and may withdraw from such care at any time.      Chief Complaint: Elevated blood sugar    Patient presents virtually to the clinic with complaint of elevated blood sugar    Patient Active Problem List:     Benign essential hypertension     Burn     Obesity (BMI 35.0-39.9 without comorbidity)     Type 2 diabetes mellitus without complication, without long-term current use of insulin     Hyperlipidemia associated with type 2 diabetes mellitus     Severe obesity (BMI 35.0-39.9) with comorbidity    Diabetes-   Lab Results       Component                Value               Date                       HGBA1C                   6.0 (H)             03/12/2024            Taking Mounjaro and Metformin.   States lately blood sugars have been running higher- states ranging around 180-190.   She is following a diabetic diet.     Has no other complaints or concerns today.     Patient educated on plan of care,  verbalized understanding.         Review of Systems   Constitutional:  Negative for activity change, appetite change, chills, diaphoresis and fever.   HENT:  Negative for congestion, ear pain, postnasal drip, sinus pressure, sneezing and sore throat.    Eyes:  Negative for pain, discharge, redness and itching.   Respiratory:  Negative for apnea, cough, chest tightness, shortness of breath and wheezing.    Cardiovascular:  Negative for chest pain and leg swelling.   Gastrointestinal:  Positive for nausea. Negative for abdominal distention, abdominal pain, constipation, diarrhea and vomiting.        Intermittent   Genitourinary:  Negative for difficulty urinating, dysuria, flank pain and frequency.   Skin:  Negative for color change, rash and wound.   Neurological:  Negative for dizziness.   All other systems reviewed and are negative.      Patient Active Problem List   Diagnosis    Benign essential hypertension    Burn    Obesity (BMI 35.0-39.9 without comorbidity)    Type 2 diabetes mellitus without complication, without long-term current use of insulin    Hyperlipidemia associated with type 2 diabetes mellitus    Severe obesity (BMI 35.0-39.9) with comorbidity       Objective:      Physical Exam  Constitutional:       General: She is not in acute distress.     Appearance: Normal appearance. She is not ill-appearing.   HENT:      Head: Normocephalic.   Eyes:      Conjunctiva/sclera: Conjunctivae normal.      Pupils: Pupils are equal, round, and reactive to light.      Comments: As seen on virtual visit   Pulmonary:      Effort: Pulmonary effort is normal. No respiratory distress.   Musculoskeletal:      Cervical back: Normal range of motion and neck supple.   Skin:     General: Skin is warm and dry.   Neurological:      General: No focal deficit present.      Mental Status: She is alert and oriented to person, place, and time.   Psychiatric:         Mood and Affect: Mood normal.         Behavior: Behavior normal.          Lab Results   Component Value Date    WBC 8.98 03/12/2024    HGB 14.6 03/12/2024    HCT 44.9 03/12/2024     03/12/2024    CHOL 164 03/12/2024    TRIG 126 03/12/2024    HDL 40 03/12/2024    ALT 19 03/12/2024    AST 17 03/12/2024     03/12/2024    K 4.4 03/12/2024     03/12/2024    CREATININE 0.7 03/12/2024    BUN 8 03/12/2024    CO2 24 03/12/2024    TSH 1.589 12/08/2023    HGBA1C 6.0 (H) 03/12/2024     The 10-year ASCVD risk score (Umair PRADO, et al., 2019) is: 1.9%    Values used to calculate the score:      Age: 44 years      Sex: Female      Is Non- : No      Diabetic: Yes      Tobacco smoker: No      Systolic Blood Pressure: 142 mmHg      Is BP treated: No      HDL Cholesterol: 40 mg/dL      Total Cholesterol: 164 mg/dL    Assessment:       1. Type 2 diabetes mellitus without complication, without long-term current use of insulin    2. Nausea        Plan:       1. Type 2 diabetes mellitus without complication, without long-term current use of insulin  -     tirzepatide (MOUNJARO) 12.5 mg/0.5 mL PnIj; Inject 12.5 mg into the skin every 7 days.  Dispense: 4 Pen; Refill: 3   - Continue Metformin   - Diabetic High Protein Diet   - Follow up with PCP    2. Nausea  -     ondansetron (ZOFRAN-ODT) 4 MG TbDL; Take 1 tablet (4 mg total) by mouth every 8 (eight) hours as needed (nausea).  Dispense: 20 tablet; Refill: 0       Follow up if symptoms worsen or fail to improve.      Future Appointments       Date Provider Specialty Appt Notes    9/3/2024  Radiology     9/5/2024  Lab labs    9/18/2024 Paul Jones, DO Family Medicine 6 month f/u

## 2024-09-03 ENCOUNTER — HOSPITAL ENCOUNTER (OUTPATIENT)
Dept: RADIOLOGY | Facility: CLINIC | Age: 44
Discharge: HOME | End: 2024-09-03
Attending: STUDENT IN AN ORGANIZED HEALTH CARE EDUCATION/TRAINING PROGRAM
Payer: COMMERCIAL

## 2024-09-03 DIAGNOSIS — Z12.31 OTHER SCREENING MAMMOGRAM: ICD-10-CM

## 2024-09-03 PROCEDURE — 77063 BREAST TOMOSYNTHESIS BI: CPT | Mod: 26,,, | Performed by: RADIOLOGY

## 2024-09-03 PROCEDURE — 77067 SCR MAMMO BI INCL CAD: CPT | Mod: TC,PO

## 2024-09-03 PROCEDURE — 77067 SCR MAMMO BI INCL CAD: CPT | Mod: 26,,, | Performed by: RADIOLOGY

## 2024-09-03 NOTE — PROGRESS NOTES
Your mammogram results were normal.  It is recommended to repeat annual screening in one year.     Please contact our office if you have any further questions.     Sincerely,     Nataliya Jones, DO

## 2024-09-05 ENCOUNTER — LAB VISIT (OUTPATIENT)
Dept: LAB | Facility: HOSPITAL | Age: 44
End: 2024-09-05
Attending: STUDENT IN AN ORGANIZED HEALTH CARE EDUCATION/TRAINING PROGRAM
Payer: COMMERCIAL

## 2024-09-05 DIAGNOSIS — Z09 FOLLOW-UP EXAM: ICD-10-CM

## 2024-09-05 DIAGNOSIS — E11.9 TYPE 2 DIABETES MELLITUS WITHOUT COMPLICATION, WITHOUT LONG-TERM CURRENT USE OF INSULIN: ICD-10-CM

## 2024-09-05 DIAGNOSIS — E11.69 HYPERLIPIDEMIA ASSOCIATED WITH TYPE 2 DIABETES MELLITUS: ICD-10-CM

## 2024-09-05 DIAGNOSIS — E66.01 SEVERE OBESITY (BMI 35.0-39.9) WITH COMORBIDITY: ICD-10-CM

## 2024-09-05 DIAGNOSIS — E78.5 HYPERLIPIDEMIA ASSOCIATED WITH TYPE 2 DIABETES MELLITUS: ICD-10-CM

## 2024-09-05 LAB
ALBUMIN SERPL BCP-MCNC: 3.6 G/DL (ref 3.5–5.2)
ALP SERPL-CCNC: 52 U/L (ref 55–135)
ALT SERPL W/O P-5'-P-CCNC: 13 U/L (ref 10–44)
ANION GAP SERPL CALC-SCNC: 6 MMOL/L (ref 8–16)
AST SERPL-CCNC: 12 U/L (ref 10–40)
BILIRUB SERPL-MCNC: 0.4 MG/DL (ref 0.1–1)
BUN SERPL-MCNC: 10 MG/DL (ref 6–20)
CALCIUM SERPL-MCNC: 9.3 MG/DL (ref 8.7–10.5)
CHLORIDE SERPL-SCNC: 106 MMOL/L (ref 95–110)
CHOLEST SERPL-MCNC: 165 MG/DL (ref 120–199)
CHOLEST/HDLC SERPL: 3.8 {RATIO} (ref 2–5)
CO2 SERPL-SCNC: 25 MMOL/L (ref 23–29)
CREAT SERPL-MCNC: 0.7 MG/DL (ref 0.5–1.4)
ERYTHROCYTE [DISTWIDTH] IN BLOOD BY AUTOMATED COUNT: 13.2 % (ref 11.5–14.5)
EST. GFR  (NO RACE VARIABLE): >60 ML/MIN/1.73 M^2
ESTIMATED AVG GLUCOSE: 105 MG/DL (ref 68–131)
GLUCOSE SERPL-MCNC: 116 MG/DL (ref 70–110)
HBA1C MFR BLD: 5.3 % (ref 4–5.6)
HCT VFR BLD AUTO: 44.9 % (ref 37–48.5)
HDLC SERPL-MCNC: 44 MG/DL (ref 40–75)
HDLC SERPL: 26.7 % (ref 20–50)
HGB BLD-MCNC: 14.2 G/DL (ref 12–16)
LDLC SERPL CALC-MCNC: 96.6 MG/DL (ref 63–159)
MCH RBC QN AUTO: 28.6 PG (ref 27–31)
MCHC RBC AUTO-ENTMCNC: 31.6 G/DL (ref 32–36)
MCV RBC AUTO: 91 FL (ref 82–98)
NONHDLC SERPL-MCNC: 121 MG/DL
PLATELET # BLD AUTO: 342 K/UL (ref 150–450)
PMV BLD AUTO: 9.6 FL (ref 9.2–12.9)
POTASSIUM SERPL-SCNC: 4.5 MMOL/L (ref 3.5–5.1)
PROT SERPL-MCNC: 7.5 G/DL (ref 6–8.4)
RBC # BLD AUTO: 4.96 M/UL (ref 4–5.4)
SODIUM SERPL-SCNC: 137 MMOL/L (ref 136–145)
TRIGL SERPL-MCNC: 122 MG/DL (ref 30–150)
WBC # BLD AUTO: 8.13 K/UL (ref 3.9–12.7)

## 2024-09-05 PROCEDURE — 83036 HEMOGLOBIN GLYCOSYLATED A1C: CPT | Performed by: STUDENT IN AN ORGANIZED HEALTH CARE EDUCATION/TRAINING PROGRAM

## 2024-09-05 PROCEDURE — 85027 COMPLETE CBC AUTOMATED: CPT | Performed by: STUDENT IN AN ORGANIZED HEALTH CARE EDUCATION/TRAINING PROGRAM

## 2024-09-05 PROCEDURE — 80061 LIPID PANEL: CPT | Performed by: STUDENT IN AN ORGANIZED HEALTH CARE EDUCATION/TRAINING PROGRAM

## 2024-09-05 PROCEDURE — 80053 COMPREHEN METABOLIC PANEL: CPT | Performed by: STUDENT IN AN ORGANIZED HEALTH CARE EDUCATION/TRAINING PROGRAM

## 2024-09-09 ENCOUNTER — TELEPHONE (OUTPATIENT)
Dept: FAMILY MEDICINE | Facility: CLINIC | Age: 44
End: 2024-09-09
Payer: COMMERCIAL

## 2024-09-09 NOTE — TELEPHONE ENCOUNTER
----- Message from Paul Jones DO sent at 9/8/2024  4:36 PM CDT -----  Please inform the patient of the following:    Julitajocelin Elvia     Your lab results were normal, with some normal variation.  No further changes are recommended to your medical treatment at this time.     Please contact our office if you have any further questions.    Sincerely,     Paul Jones DO

## 2024-09-18 ENCOUNTER — OFFICE VISIT (OUTPATIENT)
Dept: FAMILY MEDICINE | Facility: CLINIC | Age: 44
End: 2024-09-18
Payer: COMMERCIAL

## 2024-09-18 VITALS
SYSTOLIC BLOOD PRESSURE: 120 MMHG | HEART RATE: 72 BPM | OXYGEN SATURATION: 97 % | TEMPERATURE: 98 F | WEIGHT: 252.63 LBS | BODY MASS INDEX: 37.42 KG/M2 | HEIGHT: 69 IN | DIASTOLIC BLOOD PRESSURE: 80 MMHG

## 2024-09-18 DIAGNOSIS — E78.5 HYPERLIPIDEMIA ASSOCIATED WITH TYPE 2 DIABETES MELLITUS: ICD-10-CM

## 2024-09-18 DIAGNOSIS — E11.69 HYPERLIPIDEMIA ASSOCIATED WITH TYPE 2 DIABETES MELLITUS: ICD-10-CM

## 2024-09-18 DIAGNOSIS — N94.6 MENSTRUAL CRAMPS: ICD-10-CM

## 2024-09-18 DIAGNOSIS — E11.9 TYPE 2 DIABETES MELLITUS WITHOUT COMPLICATION, WITHOUT LONG-TERM CURRENT USE OF INSULIN: ICD-10-CM

## 2024-09-18 DIAGNOSIS — Z00.00 ENCOUNTER FOR ANNUAL GENERAL MEDICAL EXAMINATION WITHOUT ABNORMAL FINDINGS IN ADULT: Primary | ICD-10-CM

## 2024-09-18 PROCEDURE — 99999 PR PBB SHADOW E&M-EST. PATIENT-LVL III: CPT | Mod: PBBFAC,,, | Performed by: STUDENT IN AN ORGANIZED HEALTH CARE EDUCATION/TRAINING PROGRAM

## 2024-09-18 RX ORDER — ONDANSETRON HYDROCHLORIDE 8 MG/1
8 TABLET, FILM COATED ORAL EVERY 8 HOURS PRN
Status: CANCELLED | OUTPATIENT
Start: 2024-09-18

## 2024-09-18 RX ORDER — DICYCLOMINE HYDROCHLORIDE 10 MG/1
10 CAPSULE ORAL 3 TIMES DAILY PRN
Qty: 90 CAPSULE | Refills: 0 | Status: SHIPPED | OUTPATIENT
Start: 2024-09-18 | End: 2024-10-19

## 2024-09-18 RX ORDER — ATORVASTATIN CALCIUM 20 MG/1
20 TABLET, FILM COATED ORAL DAILY
Qty: 90 TABLET | Refills: 3 | Status: SHIPPED | OUTPATIENT
Start: 2024-09-18 | End: 2025-09-18

## 2024-09-18 RX ORDER — TIRZEPATIDE 12.5 MG/.5ML
12.5 INJECTION, SOLUTION SUBCUTANEOUS
Qty: 4 PEN | Refills: 3 | Status: SHIPPED | OUTPATIENT
Start: 2024-09-18

## 2024-09-18 RX ORDER — ONDANSETRON 8 MG/1
8 TABLET, ORALLY DISINTEGRATING ORAL 2 TIMES DAILY PRN
Qty: 60 TABLET | Refills: 0 | Status: SHIPPED | OUTPATIENT
Start: 2024-09-18 | End: 2024-10-19

## 2024-09-18 NOTE — PROGRESS NOTES
SUBJECTIVE:    CHIEF COMPLAINT:   Chief Complaint   Patient presents with    Diabetes    Referral           274}    HISTORY OF PRESENT ILLNESS:  Elvia Cheney is a 44 y.o. female with Type 2 Diabetes Mellitus, HLD, and MO who presents to the clinic today for follow up. She is feeling well. She occasionally experiences nausea with use of Mounjaro as she still takes metformin. She denies any chest pain, shortness of breath,  fevers, chills, nausea, vomiting, diarrhea or dysuria.    She does also occasionally have abdominal pain associated with menstrual cycles.  Most recent last menstrual period has been delayed pregnancy test was found to be negative.  She would like referral to gyn    PAST MEDICAL HISTORY:     274}  Past Medical History:   Diagnosis Date    Diabetes mellitus, type 2     Hyperlipidemia        PAST SURGICAL HISTORY:  History reviewed. No pertinent surgical history.    SOCIAL HISTORY:  Social History     Socioeconomic History    Marital status:    Tobacco Use    Smoking status: Never    Smokeless tobacco: Never   Substance and Sexual Activity    Alcohol use: Not Currently    Drug use: Never    Sexual activity: Yes     Partners: Male     Birth control/protection: None     Social Determinants of Health     Financial Resource Strain: Low Risk  (3/12/2024)    Overall Financial Resource Strain (CARDIA)     Difficulty of Paying Living Expenses: Not hard at all   Food Insecurity: No Food Insecurity (3/12/2024)    Hunger Vital Sign     Worried About Running Out of Food in the Last Year: Never true     Ran Out of Food in the Last Year: Never true   Transportation Needs: No Transportation Needs (3/12/2024)    PRAPARE - Transportation     Lack of Transportation (Medical): No     Lack of Transportation (Non-Medical): No   Physical Activity: Sufficiently Active (3/12/2024)    Exercise Vital Sign     Days of Exercise per Week: 4 days     Minutes of Exercise per Session: 90 min   Stress: Stress Concern Present  (3/12/2024)    [employer] [place] [employer] - Occupational Stress Questionnaire     Feeling of Stress : Very much   Housing Stability: Low Risk  (3/12/2024)    Housing Stability Vital Sign     Unable to Pay for Housing in the Last Year: No     Number of Places Lived in the Last Year: 1     Unstable Housing in the Last Year: No       FAMILY HISTORY:       Family History   Problem Relation Name Age of Onset    Diabetes Maternal Grandmother      Hypertension Maternal Grandmother      Diabetes Paternal Grandfather      Hypertension Paternal Grandfather      Hyperthyroidism Maternal Aunt         ALLERGIES AND MEDICATIONS: updated and reviewed.      274}  Review of patient's allergies indicates:  No Known Allergies  Medication List with Changes/Refills   New Medications    DICYCLOMINE (BENTYL) 10 MG CAPSULE    Take 1 capsule (10 mg total) by mouth 3 (three) times daily as needed (Cramps).    ONDANSETRON (ZOFRAN-ODT) 8 MG TBDL    Take 1 tablet (8 mg total) by mouth 2 (two) times daily as needed (Nausea). nausea   Current Medications    ALBUTEROL (PROVENTIL/VENTOLIN HFA) 90 MCG/ACTUATION INHALER    Inhale 1 puff into the lungs every 4 (four) hours as needed for Shortness of Breath.    ALCOHOL SWABS PADM    Apply 1 each topically 3 (three) times daily.    BLOOD SUGAR DIAGNOSTIC (CONTOUR NEXT TEST STRIPS) STRP    1 strip by Misc.(Non-Drug; Combo Route) route 3 (three) times daily.    LANC[facility] (TRUE[facility]US LANCETS) [facility]    as directed    NORETHINDRONE-ETHINYL ESTRADIOL (BLISOVI FE 1/20, 28,) 1 MG-20 MCG (21)/75 MG (7) PER TABLET    Take 1 tablet by mouth once daily.   Changed and/or Refilled Medications    Modified Medication Previous Medication    ATORVASTATIN (LIPITOR) 20 MG TABLET atorvastatin (LIPITOR) 20 MG tablet       Take 1 tablet (20 mg total) by mouth once daily.    Take 1 tablet (20 mg total) by mouth once daily.    TIRZEPATIDE (MOUNJARO) 12.5 MG/0.5 ML PNIJ tirzepatide (MOUNJARO) 12.5 mg/0.5 mL  "PnIj       Inject 12.5 mg into the skin every 7 days.    Inject 12.5 mg into the skin every 7 days.   Discontinued Medications    BLOOD-GLUCOSE METER,CONTINUOUS (DEXCOM G6 ) MISC    1 Units by Misc.(Non-Drug; Combo Route) route continuous.    BLOOD-GLUCOSE SENSOR (DEXCOM G6 SENSOR) MIRANDA    3 Devices by Misc.(Non-Drug; Combo Route) route every 10 days.    BLOOD-GLUCOSE TRANSMITTER (DEXCOM G6 TRANSMITTER) MIRANDA    1 Device by Misc.(Non-Drug; Combo Route) route continuous.    CETIRIZINE (ZYRTEC) 10 MG TABLET    Take 10 mg by mouth.    METFORMIN (GLUCOPHAGE-XR) 750 MG ER 24HR TABLET    Take 1 tablet (750 mg total) by mouth daily with breakfast.    ONDANSETRON (ZOFRAN-ODT) 4 MG TBDL    Take 1 tablet (4 mg total) by mouth every 8 (eight) hours as needed (nausea).    TIRZEPATIDE 10 MG/0.5 ML PNIJ    Inject 10 mg into the skin every 7 days.       SCREENING HISTORY:    274}  Health Maintenance         Date Due Completion Date    Pneumococcal Vaccines (Age 0-64) (1 of 2 - PCV) Never done ---    Low Dose Statin Never done ---    Influenza Vaccine (1) Never done ---    COVID-19 Vaccine (3 - 2023-24 season) 09/01/2024 11/8/2021    Eye Exam 02/21/2025 2/21/2024    Hemoglobin A1c 03/05/2025 9/5/2024    Diabetes Urine Screening 03/12/2025 3/12/2024    Foot Exam 06/06/2025 6/6/2024    Mammogram 09/03/2025 9/3/2024    Lipid Panel 09/05/2025 9/5/2024    TETANUS VACCINE 03/30/2027 3/30/2017    Cervical Cancer Screening 06/09/2028 6/9/2023            REVIEW OF SYSTEMS:   Review of Systems    PHYSICAL EXAM:      274}  /80 (BP Location: Right arm, Patient Position: Sitting, BP Method: Large (Manual))   Pulse 72   Temp 98 °F (36.7 °C) (Oral)   Ht 5' 9" (1.753 m)   Wt 114.6 kg (252 lb 10.4 oz)   LMP 08/08/2024   SpO2 97%   BMI 37.31 kg/m²   Wt Readings from Last 3 Encounters:   09/18/24 114.6 kg (252 lb 10.4 oz)   06/06/24 112.4 kg (247 lb 12.8 oz)   05/27/24 112 kg (247 lb)     BP Readings from Last 3 Encounters: " "  09/18/24 120/80   05/27/24 (!) 142/90   04/18/24 120/80     Estimated body mass index is 37.31 kg/m² as calculated from the following:    Height as of this encounter: 5' 9" (1.753 m).    Weight as of this encounter: 114.6 kg (252 lb 10.4 oz).     Physical Exam  Constitutional:       Appearance: Normal appearance. She is obese.   HENT:      Head: Normocephalic and atraumatic.      Right Ear: External ear normal.      Left Ear: External ear normal.   Eyes:      Extraocular Movements: Extraocular movements intact.      Conjunctiva/sclera: Conjunctivae normal.      Pupils: Pupils are equal, round, and reactive to light.   Cardiovascular:      Rate and Rhythm: Normal rate and regular rhythm.      Pulses: Normal pulses.      Heart sounds: Normal heart sounds.   Pulmonary:      Breath sounds: Normal breath sounds.   Musculoskeletal:         General: Normal range of motion.      Cervical back: Normal range of motion.   Neurological:      Mental Status: She is alert and oriented to person, place, and time. Mental status is at baseline.         LABS:   274}  I have reviewed old labs below:  Lab Results   Component Value Date    WBC 8.13 09/05/2024    HGB 14.2 09/05/2024    HCT 44.9 09/05/2024    MCV 91 09/05/2024     09/05/2024     09/05/2024    K 4.5 09/05/2024     09/05/2024    CALCIUM 9.3 09/05/2024    CO2 25 09/05/2024     (H) 09/05/2024    BUN 10 09/05/2024    CREATININE 0.7 09/05/2024    ANIONGAP 6 (L) 09/05/2024    PROT 7.5 09/05/2024    ALBUMIN 3.6 09/05/2024    BILITOT 0.4 09/05/2024    ALKPHOS 52 (L) 09/05/2024    ALT 13 09/05/2024    AST 12 09/05/2024    CHOL 165 09/05/2024    TRIG 122 09/05/2024    HDL 44 09/05/2024    LDLCALC 96.6 09/05/2024    TSH 1.589 12/08/2023    HGBA1C 5.3 09/05/2024       ASSESSMENT AND PLAN:  274}  1. Encounter for annual general medical examination without abnormal findings in adult  -     TSH; Future; Expected date: 09/18/2024  -     Lipid Panel; Future; " Expected date: 09/18/2024  -     Hemoglobin A1C; Future; Expected date: 09/18/2024  -     Comprehensive Metabolic Panel; Future; Expected date: 09/18/2024  -     CBC Without Differential; Future; Expected date: 09/18/2024  -     Ferritin; Future; Expected date: 09/18/2024  -     T4, FREE; Future; Expected date: 09/18/2024    2. Type 2 diabetes mellitus without complication, without long-term current use of insulin  -     tirzepatide (MOUNJARO) 12.5 mg/0.5 mL PnIj; Inject 12.5 mg into the skin every 7 days.  Dispense: 4 Pen; Refill: 3  -     Hemoglobin A1C; Future; Expected date: 09/18/2024  -     Comprehensive Metabolic Panel; Future; Expected date: 09/18/2024  -     Microalbumin/Creatinine Ratio, Urine; Future; Expected date: 09/18/2024    3. Hyperlipidemia associated with type 2 diabetes mellitus  -     ondansetron (ZOFRAN-ODT) 8 MG TbDL; Take 1 tablet (8 mg total) by mouth 2 (two) times daily as needed (Nausea). nausea  Dispense: 60 tablet; Refill: 0  -     atorvastatin (LIPITOR) 20 MG tablet; Take 1 tablet (20 mg total) by mouth once daily.  Dispense: 90 tablet; Refill: 3    4. Menstrual cramps  -     Ambulatory referral/consult to Obstetrics / Gynecology; Future; Expected date: 09/25/2024  -     dicyclomine (BENTYL) 10 MG capsule; Take 1 capsule (10 mg total) by mouth 3 (three) times daily as needed (Cramps).  Dispense: 90 capsule; Refill: 0           Orders Placed This Encounter   Procedures    TSH    Lipid Panel    Hemoglobin A1C    Comprehensive Metabolic Panel    CBC Without Differential    Ferritin    T4, FREE    Microalbumin/Creatinine Ratio, Urine    Ambulatory referral/consult to Obstetrics / Gynecology       Follow up in about 6 months (around 3/18/2025). or sooner as needed.    I spent a total of 30 minutes on the day of the visit.This includes face to face time and non-face to face time preparing to see the patient (eg, review of tests), obtaining and/or reviewing separately obtained history,  documenting clinical information in the electronic or other health record, independently interpreting results and communicating results to the patient/family/caregiver, or care coordinator.

## 2024-10-28 ENCOUNTER — OFFICE VISIT (OUTPATIENT)
Dept: URGENT CARE | Facility: CLINIC | Age: 44
End: 2024-10-28
Payer: COMMERCIAL

## 2024-10-28 VITALS
RESPIRATION RATE: 19 BRPM | SYSTOLIC BLOOD PRESSURE: 146 MMHG | BODY MASS INDEX: 39.37 KG/M2 | TEMPERATURE: 98 F | HEIGHT: 66 IN | WEIGHT: 245 LBS | HEART RATE: 80 BPM | DIASTOLIC BLOOD PRESSURE: 96 MMHG | OXYGEN SATURATION: 100 %

## 2024-10-28 DIAGNOSIS — R89.4 INFLUENZA A VIRUS NOT DETECTED: ICD-10-CM

## 2024-10-28 DIAGNOSIS — Z20.822 COVID-19 VIRUS NOT DETECTED: ICD-10-CM

## 2024-10-28 DIAGNOSIS — E11.69 HYPERLIPIDEMIA ASSOCIATED WITH TYPE 2 DIABETES MELLITUS: ICD-10-CM

## 2024-10-28 DIAGNOSIS — R05.9 COUGH, UNSPECIFIED TYPE: ICD-10-CM

## 2024-10-28 DIAGNOSIS — J06.9 VIRAL URI WITH COUGH: Primary | ICD-10-CM

## 2024-10-28 DIAGNOSIS — J02.9 SORE THROAT: ICD-10-CM

## 2024-10-28 DIAGNOSIS — E78.5 HYPERLIPIDEMIA ASSOCIATED WITH TYPE 2 DIABETES MELLITUS: ICD-10-CM

## 2024-10-28 LAB
CTP QC/QA: YES
FLUAV AG NPH QL: NEGATIVE
FLUBV AG NPH QL: NEGATIVE
S PYO RRNA THROAT QL PROBE: NEGATIVE
SARS-COV-2 AG RESP QL IA.RAPID: NEGATIVE

## 2024-10-28 PROCEDURE — 87880 STREP A ASSAY W/OPTIC: CPT | Mod: QW,,,

## 2024-10-28 PROCEDURE — 87811 SARS-COV-2 COVID19 W/OPTIC: CPT | Mod: QW,S$GLB,,

## 2024-10-28 PROCEDURE — 99214 OFFICE O/P EST MOD 30 MIN: CPT | Mod: S$GLB,,,

## 2024-10-28 PROCEDURE — 87804 INFLUENZA ASSAY W/OPTIC: CPT | Mod: QW,,,

## 2024-10-28 RX ORDER — ONDANSETRON 8 MG/1
8 TABLET, ORALLY DISINTEGRATING ORAL 2 TIMES DAILY PRN
Qty: 60 TABLET | Refills: 0 | Status: SHIPPED | OUTPATIENT
Start: 2024-10-28 | End: 2024-11-30

## 2024-10-28 RX ORDER — BROMPHENIRAMINE MALEATE, PSEUDOEPHEDRINE HYDROCHLORIDE, AND DEXTROMETHORPHAN HYDROBROMIDE 2; 30; 10 MG/5ML; MG/5ML; MG/5ML
10 SYRUP ORAL 4 TIMES DAILY PRN
Qty: 118 ML | Refills: 0 | Status: SHIPPED | OUTPATIENT
Start: 2024-10-28 | End: 2024-11-07

## 2024-11-21 ENCOUNTER — OFFICE VISIT (OUTPATIENT)
Dept: URGENT CARE | Facility: CLINIC | Age: 44
End: 2024-11-21
Payer: COMMERCIAL

## 2024-11-21 VITALS
HEART RATE: 88 BPM | SYSTOLIC BLOOD PRESSURE: 122 MMHG | RESPIRATION RATE: 18 BRPM | TEMPERATURE: 98 F | OXYGEN SATURATION: 95 % | DIASTOLIC BLOOD PRESSURE: 80 MMHG

## 2024-11-21 DIAGNOSIS — U07.1 COVID-19: ICD-10-CM

## 2024-11-21 DIAGNOSIS — R09.81 NASAL SINUS CONGESTION: ICD-10-CM

## 2024-11-21 DIAGNOSIS — J02.9 SORE THROAT: Primary | ICD-10-CM

## 2024-11-21 DIAGNOSIS — U07.1 COVID-19 VIRUS DETECTED: ICD-10-CM

## 2024-11-21 LAB
CTP QC/QA: YES
FLUAV AG NPH QL: NEGATIVE
FLUBV AG NPH QL: NEGATIVE
S PYO RRNA THROAT QL PROBE: NEGATIVE
SARS-COV-2 AG RESP QL IA.RAPID: POSITIVE

## 2024-11-21 RX ORDER — AZITHROMYCIN 250 MG/1
TABLET, FILM COATED ORAL
Qty: 6 TABLET | Refills: 0 | Status: SHIPPED | OUTPATIENT
Start: 2024-11-21 | End: 2024-11-26

## 2024-11-21 RX ORDER — PROMETHAZINE HYDROCHLORIDE AND DEXTROMETHORPHAN HYDROBROMIDE 6.25; 15 MG/5ML; MG/5ML
5 SYRUP ORAL EVERY 4 HOURS PRN
Qty: 118 ML | Refills: 0 | Status: SHIPPED | OUTPATIENT
Start: 2024-11-21 | End: 2024-12-01

## 2024-11-21 NOTE — LETTER
November 21, 2024      [facility]  [address] [id number]       Patient: Elvia Cheney   YOB: 1980  Date of Visit: 11/21/2024    To Whom It May Concern:    Ramakrishna Cheney  was at [facility] on 11/21/2024. The patient may return to work/school on 11/25/2024 with no restrictions (must be fever free and symptoms improving for 24 hours). If you have any questions or concerns, or if I can be of further assistance, please do not hesitate to contact me.    Sincerely,    Eder Luna NP

## 2024-11-21 NOTE — PROGRESS NOTES
Subjective:      Patient ID: Elvia Cheney is a 44 y.o. female.    Vitals:  temperature is 98.3 °F (36.8 °C). Her blood pressure is 122/80 and her pulse is 88. Her respiration is 18 and oxygen saturation is 95%.     Chief Complaint: URI    Patient is a 44-year-old female with a past medical history of hypertension, hyperlipidemia, and type 2 diabetes who presents to clinic for evaluation of URI related symptoms.  Patient reports symptoms began yesterday.  Patient reports she is vaccinated.  Patient reports unknown sick exposure stating was at a concert this past weekend.  Patient reports started over-the-counter cough medicine last night with no significant relief to symptoms.        Constitution: Positive for chills and fatigue.   HENT:  Positive for ear pain (RIght ear), congestion, postnasal drip and sore throat. Negative for ear discharge, tinnitus and hearing loss.    Neck: neck negative.   Cardiovascular: Negative.  Negative for chest pain and palpitations.   Eyes: Negative.    Respiratory:  Positive for cough. Negative for chest tightness, sputum production and shortness of breath.    Gastrointestinal: Negative.  Negative for abdominal pain, nausea, vomiting and diarrhea.   Endocrine: negative.   Genitourinary: Negative.  Negative for dysuria, frequency and urgency.   Musculoskeletal: Negative.  Negative for muscle ache.   Skin: Negative.  Negative for color change, pale, rash and erythema.   Allergic/Immunologic: Negative.    Neurological:  Positive for headaches. Negative for dizziness, light-headedness, passing out, disorientation and altered mental status.   Hematologic/Lymphatic: Negative.    Psychiatric/Behavioral: Negative.  Negative for altered mental status, disorientation and confusion.       Objective:     Physical Exam   Constitutional: She is oriented to person, place, and time. She appears well-developed. She is cooperative.  Non-toxic appearance. She does not appear ill. No distress.   HENT:    Head: Normocephalic and atraumatic.   Ears:   Right Ear: Hearing, tympanic membrane, external ear and ear canal normal.   Left Ear: Hearing, tympanic membrane, external ear and ear canal normal.   Nose: Congestion present. No mucosal edema, rhinorrhea or nasal deformity. No epistaxis. Right sinus exhibits no maxillary sinus tenderness and no frontal sinus tenderness. Left sinus exhibits no maxillary sinus tenderness and no frontal sinus tenderness.   Mouth/Throat: Uvula is midline and mucous membranes are normal. Mucous membranes are moist. No trismus in the jaw. Normal dentition. No uvula swelling. Posterior oropharyngeal erythema present. No oropharyngeal exudate. Oropharynx is clear.   Eyes: Conjunctivae and lids are normal. Pupils are equal, round, and reactive to light. Right eye exhibits no discharge. Left eye exhibits no discharge. No scleral icterus.   Neck: Trachea normal and phonation normal. Neck supple. No neck rigidity present.   Cardiovascular: Normal rate, regular rhythm, normal heart sounds and normal pulses.   Pulmonary/Chest: Effort normal and breath sounds normal. No respiratory distress. She has no wheezes. She has no rhonchi. She has no rales.   Abdominal: Normal appearance and bowel sounds are normal. She exhibits no distension. Soft. There is no abdominal tenderness.   Musculoskeletal: Normal range of motion.         General: Normal range of motion.      Cervical back: She exhibits no tenderness.   Lymphadenopathy:     She has no cervical adenopathy.   Neurological: She is alert and oriented to person, place, and time. She exhibits normal muscle tone.   Skin: Skin is warm, dry, intact, not diaphoretic, not pale and no rash. Capillary refill takes less than 2 seconds. No erythema   Psychiatric: Her speech is normal and behavior is normal. Judgment and thought content normal.   Nursing note and vitals reviewed.      Assessment:     1. Sore throat    2. Nasal sinus congestion    3. COVID-19         Plan:       Sore throat  -     SARS Coronavirus 2 Antigen, POCT Manual Read  -     POCT Influenza A/B Rapid Antigen  -     POCT rapid strep A    Nasal sinus congestion  -     SARS Coronavirus 2 Antigen, POCT Manual Read  -     POCT Influenza A/B Rapid Antigen    COVID-19    Other orders  -     azithromycin (Z-LEE) 250 MG tablet; Take 2 tablets by mouth on day 1; Take 1 tablet by mouth on days 2-5  Dispense: 6 tablet; Refill: 0  -     promethazine-dextromethorphan (PROMETHAZINE-DM) 6.25-15 mg/5 mL Syrp; Take 5 mLs by mouth every 4 (four) hours as needed (Cough).  Dispense: 118 mL; Refill: 0                Labs:  COVID positive.  Influenza a and B negative.  Rapid strep negative  Quarantine as directed.  Quarantine information provided to patient.  COVID recommendations provided.  (Vitamin-C 2000 mg daily, vitamin D3 1000 IU daily, Pepcid 40 mg daily, Zyrtec 10 mg daily, zinc 50 mg daily)  Tylenol per package instructions for any pain or fever.  May rotate with Motrin if unable to control pain or fever along with Tylenol.  Monitor home SpO2 and present to emergency department with readings less than 92%.  Take medications as prescribed.  Assure adequate hydration.  Follow-up with PCP in 1-2 days.  Return to clinic as needed.  To ED for any new or acutely worsening symptoms including but not limited to chest pain, palpitations, shortness of breath, or fever greater than 103° F.  Patient in agreement with plan of care.    DISCLAIMER: Please note that my documentation in this Electronic Healthcare Record was produced using speech recognition software and therefore may contain errors related to that software system.These could include grammar, punctuation and spelling errors or the inclusion/exclusion of phrases that were not intended. Garbled syntax, mangled pronouns, and other bizarre constructions may be attributed to that software system.

## 2025-03-05 DIAGNOSIS — E11.9 TYPE 2 DIABETES MELLITUS WITHOUT COMPLICATION, UNSPECIFIED WHETHER LONG TERM INSULIN USE: ICD-10-CM

## 2025-03-18 ENCOUNTER — TELEPHONE (OUTPATIENT)
Dept: FAMILY MEDICINE | Facility: CLINIC | Age: 45
End: 2025-03-18

## 2025-03-18 NOTE — TELEPHONE ENCOUNTER
Attempted to call pt regarding appt on 3/18 with Dr. Jones, left VM to either reschedule or give us a call

## 2025-03-19 DIAGNOSIS — E11.9 TYPE 2 DIABETES MELLITUS WITHOUT COMPLICATION: ICD-10-CM

## 2025-06-03 ENCOUNTER — PATIENT MESSAGE (OUTPATIENT)
Dept: ADMINISTRATIVE | Facility: HOSPITAL | Age: 45
End: 2025-06-03